# Patient Record
Sex: FEMALE | Race: BLACK OR AFRICAN AMERICAN | NOT HISPANIC OR LATINO | Employment: OTHER | URBAN - NONMETROPOLITAN AREA
[De-identification: names, ages, dates, MRNs, and addresses within clinical notes are randomized per-mention and may not be internally consistent; named-entity substitution may affect disease eponyms.]

---

## 2017-01-24 ENCOUNTER — LAB (OUTPATIENT)
Dept: LAB | Facility: HOSPITAL | Age: 82
End: 2017-01-24

## 2017-01-24 ENCOUNTER — OFFICE VISIT (OUTPATIENT)
Dept: FAMILY MEDICINE CLINIC | Facility: CLINIC | Age: 82
End: 2017-01-24

## 2017-01-24 VITALS
OXYGEN SATURATION: 100 % | WEIGHT: 118.6 LBS | BODY MASS INDEX: 23.29 KG/M2 | SYSTOLIC BLOOD PRESSURE: 120 MMHG | HEART RATE: 107 BPM | DIASTOLIC BLOOD PRESSURE: 65 MMHG | HEIGHT: 60 IN

## 2017-01-24 DIAGNOSIS — R11.11 NON-INTRACTABLE VOMITING WITHOUT NAUSEA, UNSPECIFIED VOMITING TYPE: ICD-10-CM

## 2017-01-24 DIAGNOSIS — R11.11 NON-INTRACTABLE VOMITING WITHOUT NAUSEA, UNSPECIFIED VOMITING TYPE: Primary | ICD-10-CM

## 2017-01-24 LAB
ALBUMIN SERPL-MCNC: 3.3 G/DL (ref 3.4–4.8)
ALBUMIN/GLOB SERPL: 0.9 G/DL (ref 1.1–1.8)
ALP SERPL-CCNC: 66 U/L (ref 38–126)
ALT SERPL W P-5'-P-CCNC: 22 U/L (ref 9–52)
AMYLASE SERPL-CCNC: 129 U/L (ref 50–130)
ANION GAP SERPL CALCULATED.3IONS-SCNC: 10 MMOL/L (ref 5–15)
AST SERPL-CCNC: 23 U/L (ref 14–36)
BASOPHILS # BLD AUTO: 0.03 10*3/MM3 (ref 0–0.2)
BASOPHILS NFR BLD AUTO: 0.4 % (ref 0–2)
BILIRUB SERPL-MCNC: 0.6 MG/DL (ref 0.2–1.3)
BUN BLD-MCNC: 44 MG/DL (ref 7–21)
BUN/CREAT SERPL: 29.1 (ref 7–25)
CALCIUM SPEC-SCNC: 8.9 MG/DL (ref 8.4–10.2)
CHLORIDE SERPL-SCNC: 101 MMOL/L (ref 95–110)
CO2 SERPL-SCNC: 27 MMOL/L (ref 22–31)
CREAT BLD-MCNC: 1.51 MG/DL (ref 0.5–1)
DEPRECATED RDW RBC AUTO: 43.2 FL (ref 36.4–46.3)
EOSINOPHIL # BLD AUTO: 0.36 10*3/MM3 (ref 0–0.7)
EOSINOPHIL NFR BLD AUTO: 5.3 % (ref 0–7)
ERYTHROCYTE [DISTWIDTH] IN BLOOD BY AUTOMATED COUNT: 13.1 % (ref 11.5–14.5)
GFR SERPL CREATININE-BSD FRML MDRD: 38 ML/MIN/1.73 (ref 39–90)
GLOBULIN UR ELPH-MCNC: 3.6 GM/DL (ref 2.3–3.5)
GLUCOSE BLD-MCNC: 109 MG/DL (ref 60–100)
HCT VFR BLD AUTO: 32.9 % (ref 35–45)
HGB BLD-MCNC: 10.7 G/DL (ref 12–15.5)
IMM GRANULOCYTES # BLD: 0.01 10*3/MM3 (ref 0–0.02)
IMM GRANULOCYTES NFR BLD: 0.1 % (ref 0–0.5)
LIPASE SERPL-CCNC: 188 U/L (ref 23–300)
LYMPHOCYTES # BLD AUTO: 2.19 10*3/MM3 (ref 0.6–4.2)
LYMPHOCYTES NFR BLD AUTO: 32.2 % (ref 10–50)
MCH RBC QN AUTO: 29.3 PG (ref 26.5–34)
MCHC RBC AUTO-ENTMCNC: 32.5 G/DL (ref 31.4–36)
MCV RBC AUTO: 90.1 FL (ref 80–98)
MONOCYTES # BLD AUTO: 0.59 10*3/MM3 (ref 0–0.9)
MONOCYTES NFR BLD AUTO: 8.7 % (ref 0–12)
NEUTROPHILS # BLD AUTO: 3.62 10*3/MM3 (ref 2–8.6)
NEUTROPHILS NFR BLD AUTO: 53.3 % (ref 37–80)
PLATELET # BLD AUTO: 252 10*3/MM3 (ref 150–450)
PMV BLD AUTO: 10.9 FL (ref 8–12)
POTASSIUM BLD-SCNC: 4.6 MMOL/L (ref 3.5–5.1)
PROT SERPL-MCNC: 6.9 G/DL (ref 6.3–8.6)
RBC # BLD AUTO: 3.65 10*6/MM3 (ref 3.77–5.16)
SODIUM BLD-SCNC: 138 MMOL/L (ref 137–145)
WBC NRBC COR # BLD: 6.8 10*3/MM3 (ref 3.2–9.8)

## 2017-01-24 PROCEDURE — 99213 OFFICE O/P EST LOW 20 MIN: CPT | Performed by: GENERAL PRACTICE

## 2017-01-24 PROCEDURE — 82150 ASSAY OF AMYLASE: CPT | Performed by: GENERAL PRACTICE

## 2017-01-24 PROCEDURE — 85025 COMPLETE CBC W/AUTO DIFF WBC: CPT | Performed by: GENERAL PRACTICE

## 2017-01-24 PROCEDURE — 83690 ASSAY OF LIPASE: CPT | Performed by: GENERAL PRACTICE

## 2017-01-24 PROCEDURE — 80053 COMPREHEN METABOLIC PANEL: CPT | Performed by: GENERAL PRACTICE

## 2017-01-24 PROCEDURE — 36415 COLL VENOUS BLD VENIPUNCTURE: CPT

## 2017-02-16 RX ORDER — BIMATOPROST 0.01 %
DROPS OPHTHALMIC (EYE)
Qty: 7.5 ML | Refills: 2 | Status: SHIPPED | OUTPATIENT
Start: 2017-02-16 | End: 2017-08-23 | Stop reason: SDUPTHER

## 2017-02-23 ENCOUNTER — OFFICE VISIT (OUTPATIENT)
Dept: OPHTHALMOLOGY | Facility: CLINIC | Age: 82
End: 2017-02-23

## 2017-02-23 DIAGNOSIS — H40.1131 PRIMARY OPEN ANGLE GLAUCOMA OF BOTH EYES, MILD STAGE: Primary | ICD-10-CM

## 2017-02-23 DIAGNOSIS — Z96.1 PSEUDOPHAKIA: ICD-10-CM

## 2017-02-23 PROCEDURE — 99213 OFFICE O/P EST LOW 20 MIN: CPT | Performed by: OPHTHALMOLOGY

## 2017-02-23 NOTE — PROGRESS NOTES
Subjective   Radha Frias [facility] is a 89 y.o. female.   Chief Complaint   Patient presents with   • Glaucoma     Low risk   • Artificial Lens Present       HPI     Glaucoma   In both eyes. Additional comments: Low risk       Last edited by Ryder Bazzi MD on 2/23/2017  1:31 PM. (History)          Review of Systems   Eyes: Negative for pain and visual disturbance.       Objective   Visual Acuity (Snellen - Linear)      Right Left   Dist cc 20/70 -2 20/50 -2       Correction:  Glasses               Pupils      Pupils   Right PERRL   Left PERRL           Confrontational Visual Fields     Visual Fields      Left Right   Result Full Full                  Extraocular Movement      Right Left   Result Full Full              Tonometry (Applanation, 1:31 PM)      Right Left   Pressure 15 15              Main Ophthalmology Exam     External Exam      Right Left    External Normal Normal      Slit Lamp Exam      Right Left    Lids/Lashes Normal Normal    Conjunctiva/Sclera White and quiet White and quiet    Cornea Clear Clear    Anterior Chamber Deep and quiet Deep and quiet    Iris Round and reactive Round and reactive    Lens Posterior chamber intraocular lens Posterior chamber intraocular lens    Vitreous Normal Normal      Fundus Exam      Right Left    Disc Sloped cup  Sloped cup    Macula Normal Normal    Vessels Normal Normal                Assessment/Plan   Diagnoses and all orders for this visit:    Primary open angle glaucoma of both eyes, mild stage    Pseudophakia              Return in about 6 months (around 8/23/2017) for OCT DISC, Dilated exam.

## 2017-03-20 ENCOUNTER — TELEPHONE (OUTPATIENT)
Dept: FAMILY MEDICINE CLINIC | Facility: CLINIC | Age: 82
End: 2017-03-20

## 2017-03-20 NOTE — TELEPHONE ENCOUNTER
MRS MIMMS HAS APPOINTMENT TO BE SEEN ON Friday BUT HER SON IS IN AND WOULD LIKE FOR HER TO BE SEEN TOMORROW IF POSSIBLE

## 2017-03-21 ENCOUNTER — OFFICE VISIT (OUTPATIENT)
Dept: FAMILY MEDICINE CLINIC | Facility: CLINIC | Age: 82
End: 2017-03-21

## 2017-03-21 ENCOUNTER — LAB (OUTPATIENT)
Dept: LAB | Facility: HOSPITAL | Age: 82
End: 2017-03-21

## 2017-03-21 VITALS
HEART RATE: 61 BPM | OXYGEN SATURATION: 99 % | WEIGHT: 117.8 LBS | HEIGHT: 60 IN | BODY MASS INDEX: 23.13 KG/M2 | SYSTOLIC BLOOD PRESSURE: 125 MMHG | DIASTOLIC BLOOD PRESSURE: 60 MMHG

## 2017-03-21 DIAGNOSIS — I10 ESSENTIAL HYPERTENSION: Primary | ICD-10-CM

## 2017-03-21 DIAGNOSIS — D64.9 ANEMIA, UNSPECIFIED TYPE: ICD-10-CM

## 2017-03-21 DIAGNOSIS — R53.83 OTHER FATIGUE: ICD-10-CM

## 2017-03-21 PROCEDURE — 99213 OFFICE O/P EST LOW 20 MIN: CPT | Performed by: GENERAL PRACTICE

## 2017-03-21 NOTE — PROGRESS NOTES
Subjective   Radha Frias [facility] is a 89 y.o. female.     Chief Complaint   Patient presents with   • Follow-up   • Hypertension     Hypertension   This is a chronic problem. The current episode started more than 1 year ago. The problem is unchanged. Pertinent negatives include no chest pain, headaches, neck pain, palpitations or shortness of breath. There are no associated agents to hypertension. There are no known risk factors for coronary artery disease. Past treatments include beta blockers and diuretics. The current treatment provides significant improvement. There are no compliance problems.    Has been sleeping more, more tired. Eating fair. Has some anemia probably of age.    The following portions of the patient's history were reviewed and updated as appropriate: allergies, current medications, past social history and problem list.    Current Outpatient Prescriptions:   •  levobunolol (BETAGAN) 0.5 % ophthalmic solution, Administer 1 drop to both eyes 2 (Two) Times a Day., Disp: 15 mL, Rfl: 3  •  LUMIGAN 0.01 % ophthalmic drops, INSTILL 1 DROP IN BOTH EYES AT BEDTIME, Disp: 7.5 mL, Rfl: 2  •  metoprolol succinate XL (TOPROL XL) 25 MG 24 hr tablet, Take 1 tablet by mouth Daily., Disp: 90 tablet, Rfl: 3  •  triamterene-hydrochlorothiazide (MAXZIDE-25) 37.5-25 MG per tablet, Take 0.5 tablets by mouth Daily., Disp: 45 tablet, Rfl: 3    Review of Systems   Constitutional: Positive for fatigue. Negative for activity change, appetite change, chills, fever and unexpected weight change.   HENT: Negative.  Negative for congestion, ear pain, hearing loss, nosebleeds, rhinorrhea, sinus pressure, sneezing, sore throat, tinnitus and trouble swallowing.    Eyes: Negative.  Negative for pain, discharge, redness, itching and visual disturbance.   Respiratory: Negative.  Negative for apnea, cough, chest tightness, shortness of breath and wheezing.    Cardiovascular: Negative.  Negative for chest pain, palpitations and leg  "swelling.   Gastrointestinal: Negative.  Negative for abdominal distention, abdominal pain, constipation, diarrhea, nausea and vomiting.   Endocrine: Negative.    Genitourinary: Negative.  Negative for dysuria, frequency and urgency.   Musculoskeletal: Negative.  Negative for arthralgias, back pain, gait problem, joint swelling, myalgias, neck pain and neck stiffness.   Skin: Negative.  Negative for color change and rash.   Allergic/Immunologic: Negative.    Neurological: Negative.  Negative for dizziness, weakness, light-headedness, numbness and headaches.   Hematological: Negative.  Negative for adenopathy.   Psychiatric/Behavioral: Negative.  Negative for dysphoric mood and sleep disturbance. The patient is not nervous/anxious.      Objective     Visit Vitals   • /60 (BP Location: Left arm, Patient Position: Sitting, Cuff Size: Adult)   • Pulse 61   • Ht 60\" (152.4 cm)   • Wt 117 lb 12.8 oz (53.4 kg)   • SpO2 99%   • BMI 23.01 kg/m2     Physical Exam   Constitutional: She is oriented to person, place, and time. She appears well-developed and well-nourished. No distress.   HENT:   Head: Normocephalic and atraumatic.   Nose: Nose normal.   Mouth/Throat: Oropharynx is clear and moist.   Eyes: Conjunctivae and EOM are normal. Pupils are equal, round, and reactive to light. Right eye exhibits no discharge. Left eye exhibits no discharge.   Neck: No thyromegaly present.   Cardiovascular: Normal rate, regular rhythm, normal heart sounds and intact distal pulses.    Pulmonary/Chest: Effort normal and breath sounds normal.   Lymphadenopathy:     She has no cervical adenopathy.   Neurological: She is alert and oriented to person, place, and time.   Skin: Skin is warm and dry.   Psychiatric: She has a normal mood and affect.   Nursing note and vitals reviewed.    Assessment/Plan     Problem List Items Addressed This Visit        Cardiovascular and Mediastinum    Essential hypertension - Primary      Other Visit " Diagnoses     Anemia, unspecified type        Relevant Orders    CBC & Differential    Other fatigue        Relevant Orders    CBC & Differential    Basic Metabolic Panel    Vitamin B12    TSH    T4, Free        Will notify regarding results. Start multivitamin.     No orders of the defined types were placed in this encounter.

## 2017-03-24 RX ORDER — LORATADINE 10 MG/1
10 TABLET ORAL DAILY
Qty: 30 TABLET | Refills: 5 | Status: SHIPPED | OUTPATIENT
Start: 2017-03-24 | End: 2017-06-21 | Stop reason: SDUPTHER

## 2017-03-30 ENCOUNTER — TELEPHONE (OUTPATIENT)
Dept: FAMILY MEDICINE CLINIC | Facility: CLINIC | Age: 82
End: 2017-03-30

## 2017-03-30 RX ORDER — FLUTICASONE PROPIONATE 50 MCG
2 SPRAY, SUSPENSION (ML) NASAL DAILY
Qty: 1 EACH | Refills: 1 | Status: SHIPPED | OUTPATIENT
Start: 2017-03-30 | End: 2017-04-29

## 2017-03-30 NOTE — TELEPHONE ENCOUNTER
MRS CALLE HAS SINUS DRAINAGE DOWN BACK OF HER THROAT AND HER NOSE IS STOPPED UP SHE IS TAKING HER ALLERGY MEDICATION BUT NOT HELPING DOES NOT HAVE FEVER AT THIS TIME IS THERE ANYTHING SHE NEEDS TO DO

## 2017-04-20 ENCOUNTER — APPOINTMENT (OUTPATIENT)
Dept: LAB | Facility: HOSPITAL | Age: 82
End: 2017-04-20

## 2017-04-20 LAB
ANION GAP SERPL CALCULATED.3IONS-SCNC: 12 MMOL/L (ref 5–15)
BASOPHILS # BLD AUTO: 0.04 10*3/MM3 (ref 0–0.2)
BASOPHILS NFR BLD AUTO: 0.5 % (ref 0–2)
BUN BLD-MCNC: 41 MG/DL (ref 7–21)
BUN/CREAT SERPL: 26.3 (ref 7–25)
CALCIUM SPEC-SCNC: 8.7 MG/DL (ref 8.4–10.2)
CHLORIDE SERPL-SCNC: 99 MMOL/L (ref 95–110)
CO2 SERPL-SCNC: 25 MMOL/L (ref 22–31)
CREAT BLD-MCNC: 1.56 MG/DL (ref 0.5–1)
DEPRECATED RDW RBC AUTO: 43.5 FL (ref 36.4–46.3)
EOSINOPHIL # BLD AUTO: 0.27 10*3/MM3 (ref 0–0.7)
EOSINOPHIL NFR BLD AUTO: 3.6 % (ref 0–7)
ERYTHROCYTE [DISTWIDTH] IN BLOOD BY AUTOMATED COUNT: 13.4 % (ref 11.5–14.5)
GFR SERPL CREATININE-BSD FRML MDRD: 37 ML/MIN/1.73 (ref 39–90)
GLUCOSE BLD-MCNC: 104 MG/DL (ref 60–100)
HCT VFR BLD AUTO: 32.9 % (ref 35–45)
HGB BLD-MCNC: 10.8 G/DL (ref 12–15.5)
IMM GRANULOCYTES # BLD: 0.01 10*3/MM3 (ref 0–0.02)
IMM GRANULOCYTES NFR BLD: 0.1 % (ref 0–0.5)
LYMPHOCYTES # BLD AUTO: 2.15 10*3/MM3 (ref 0.6–4.2)
LYMPHOCYTES NFR BLD AUTO: 28.7 % (ref 10–50)
MCH RBC QN AUTO: 29.6 PG (ref 26.5–34)
MCHC RBC AUTO-ENTMCNC: 32.8 G/DL (ref 31.4–36)
MCV RBC AUTO: 90.1 FL (ref 80–98)
MONOCYTES # BLD AUTO: 0.69 10*3/MM3 (ref 0–0.9)
MONOCYTES NFR BLD AUTO: 9.2 % (ref 0–12)
NEUTROPHILS # BLD AUTO: 4.33 10*3/MM3 (ref 2–8.6)
NEUTROPHILS NFR BLD AUTO: 57.9 % (ref 37–80)
PLATELET # BLD AUTO: 279 10*3/MM3 (ref 150–450)
PMV BLD AUTO: 10.8 FL (ref 8–12)
POTASSIUM BLD-SCNC: 4.7 MMOL/L (ref 3.5–5.1)
RBC # BLD AUTO: 3.65 10*6/MM3 (ref 3.77–5.16)
SODIUM BLD-SCNC: 136 MMOL/L (ref 137–145)
T4 FREE SERPL-MCNC: 1.29 NG/DL (ref 0.78–2.19)
TSH SERPL DL<=0.05 MIU/L-ACNC: 2.96 MIU/ML (ref 0.46–4.68)
VIT B12 BLD-MCNC: 518 PG/ML (ref 239–931)
WBC NRBC COR # BLD: 7.49 10*3/MM3 (ref 3.2–9.8)

## 2017-04-20 PROCEDURE — 85025 COMPLETE CBC W/AUTO DIFF WBC: CPT | Performed by: GENERAL PRACTICE

## 2017-04-20 PROCEDURE — 84439 ASSAY OF FREE THYROXINE: CPT

## 2017-04-20 PROCEDURE — 36415 COLL VENOUS BLD VENIPUNCTURE: CPT | Performed by: GENERAL PRACTICE

## 2017-04-20 PROCEDURE — 84443 ASSAY THYROID STIM HORMONE: CPT

## 2017-04-20 PROCEDURE — 82607 VITAMIN B-12: CPT

## 2017-04-20 PROCEDURE — 80048 BASIC METABOLIC PNL TOTAL CA: CPT | Performed by: GENERAL PRACTICE

## 2017-05-03 ENCOUNTER — OFFICE VISIT (OUTPATIENT)
Dept: PODIATRY | Facility: CLINIC | Age: 82
End: 2017-05-03

## 2017-05-03 VITALS — HEIGHT: 60 IN | WEIGHT: 117 LBS | BODY MASS INDEX: 22.97 KG/M2

## 2017-05-03 DIAGNOSIS — M79.672 FOOT PAIN, BILATERAL: Primary | ICD-10-CM

## 2017-05-03 DIAGNOSIS — M20.41 HAMMER TOES OF BOTH FEET: ICD-10-CM

## 2017-05-03 DIAGNOSIS — B35.1 ONYCHOMYCOSIS: ICD-10-CM

## 2017-05-03 DIAGNOSIS — M79.671 FOOT PAIN, BILATERAL: Primary | ICD-10-CM

## 2017-05-03 DIAGNOSIS — M20.42 HAMMER TOES OF BOTH FEET: ICD-10-CM

## 2017-05-03 PROCEDURE — 99213 OFFICE O/P EST LOW 20 MIN: CPT | Performed by: PODIATRIST

## 2017-06-21 ENCOUNTER — OFFICE VISIT (OUTPATIENT)
Dept: FAMILY MEDICINE CLINIC | Facility: CLINIC | Age: 82
End: 2017-06-21

## 2017-06-21 VITALS
HEIGHT: 60 IN | OXYGEN SATURATION: 99 % | HEART RATE: 61 BPM | BODY MASS INDEX: 22.93 KG/M2 | DIASTOLIC BLOOD PRESSURE: 65 MMHG | SYSTOLIC BLOOD PRESSURE: 118 MMHG | WEIGHT: 116.8 LBS

## 2017-06-21 DIAGNOSIS — I10 ESSENTIAL HYPERTENSION: Primary | Chronic | ICD-10-CM

## 2017-06-21 PROCEDURE — 99212 OFFICE O/P EST SF 10 MIN: CPT | Performed by: GENERAL PRACTICE

## 2017-06-21 RX ORDER — FLUTICASONE PROPIONATE 50 MCG
SPRAY, SUSPENSION (ML) NASAL
COMMUNITY
Start: 2017-05-24 | End: 2017-06-21 | Stop reason: SDUPTHER

## 2017-06-21 RX ORDER — LORATADINE 10 MG/1
10 TABLET ORAL DAILY
Qty: 30 TABLET | Refills: 5 | Status: SHIPPED | OUTPATIENT
Start: 2017-06-21 | End: 2018-01-23 | Stop reason: HOSPADM

## 2017-06-21 RX ORDER — TRIAMTERENE AND HYDROCHLOROTHIAZIDE 37.5; 25 MG/1; MG/1
0.5 TABLET ORAL DAILY
Qty: 45 TABLET | Refills: 3 | Status: SHIPPED | OUTPATIENT
Start: 2017-06-21

## 2017-06-21 RX ORDER — FLUTICASONE PROPIONATE 50 MCG
2 SPRAY, SUSPENSION (ML) NASAL DAILY
Qty: 1 EACH | Refills: 5 | Status: SHIPPED | OUTPATIENT
Start: 2017-06-21 | End: 2017-09-13 | Stop reason: ALTCHOICE

## 2017-06-21 RX ORDER — METOPROLOL SUCCINATE 25 MG/1
25 TABLET, EXTENDED RELEASE ORAL DAILY
Qty: 90 TABLET | Refills: 3 | Status: SHIPPED | OUTPATIENT
Start: 2017-06-21

## 2017-06-21 NOTE — PROGRESS NOTES
Subjective   Radha Frias [facility] is a 89 y.o. female.   Chief Complaint   Patient presents with   • Follow-up   • Hypertension     Hypertension   This is a chronic problem. The current episode started more than 1 year ago. The problem is unchanged. Pertinent negatives include no chest pain, headaches, neck pain, palpitations or shortness of breath. There are no associated agents to hypertension. There are no known risk factors for coronary artery disease. Past treatments include beta blockers and diuretics. The current treatment provides significant improvement. There are no compliance problems.       The following portions of the patient's history were reviewed and updated as appropriate: allergies, current medications, past social history and problem list.    Current Outpatient Prescriptions:   •  levobunolol (BETAGAN) 0.5 % ophthalmic solution, Administer 1 drop to both eyes 2 (Two) Times a Day., Disp: 15 mL, Rfl: 3  •  loratadine (ALAVERT) 10 MG tablet, Take 1 tablet by mouth Daily., Disp: 30 tablet, Rfl: 5  •  LUMIGAN 0.01 % ophthalmic drops, INSTILL 1 DROP IN BOTH EYES AT BEDTIME, Disp: 7.5 mL, Rfl: 2  •  metoprolol succinate XL (TOPROL XL) 25 MG 24 hr tablet, Take 1 tablet by mouth Daily., Disp: 90 tablet, Rfl: 3  •  Multiple Vitamins-Minerals (CENTRUM SILVER PO), Take 1 tablet by mouth Daily., Disp: , Rfl:   •  triamterene-hydrochlorothiazide (MAXZIDE-25) 37.5-25 MG per tablet, Take 0.5 tablets by mouth Daily., Disp: 45 tablet, Rfl: 3  •  fluticasone (FLONASE) 50 MCG/ACT nasal spray, 2 sprays into each nostril Daily., Disp: 1 each, Rfl: 5    Review of Systems   Constitutional: Negative.  Negative for activity change, appetite change, chills, fatigue, fever and unexpected weight change.   HENT: Negative.  Negative for congestion, ear pain, hearing loss, nosebleeds, rhinorrhea, sinus pressure, sneezing, sore throat, tinnitus and trouble swallowing.    Eyes: Negative.  Negative for pain, discharge, redness,  "itching and visual disturbance.   Respiratory: Negative.  Negative for apnea, cough, chest tightness, shortness of breath and wheezing.    Cardiovascular: Negative.  Negative for chest pain, palpitations and leg swelling.   Gastrointestinal: Negative.  Negative for abdominal distention, abdominal pain, constipation, diarrhea, nausea and vomiting.   Endocrine: Negative.    Genitourinary: Negative.  Negative for dysuria, frequency and urgency.   Musculoskeletal: Negative.  Negative for arthralgias, back pain, gait problem, joint swelling, myalgias, neck pain and neck stiffness.   Skin: Negative.  Negative for color change and rash.   Allergic/Immunologic: Negative.    Neurological: Negative.  Negative for dizziness, weakness, light-headedness, numbness and headaches.   Hematological: Negative.  Negative for adenopathy.   Psychiatric/Behavioral: Negative.  Negative for dysphoric mood and sleep disturbance. The patient is not nervous/anxious.      Objective   Visit Vitals   • /65 (BP Location: Left arm, Patient Position: Sitting, Cuff Size: Adult)   • Pulse 61   • Ht 60\" (152.4 cm)   • Wt 116 lb 12.8 oz (53 kg)   • SpO2 99%   • BMI 22.81 kg/m2     Physical Exam   Constitutional: She is oriented to person, place, and time. She appears well-developed and well-nourished. No distress.   HENT:   Head: Normocephalic and atraumatic.   Nose: Nose normal.   Mouth/Throat: Oropharynx is clear and moist.   Eyes: Conjunctivae and EOM are normal. Pupils are equal, round, and reactive to light. Right eye exhibits no discharge. Left eye exhibits no discharge.   Neck: No thyromegaly present.   Cardiovascular: Normal rate, regular rhythm, normal heart sounds and intact distal pulses.    Pulmonary/Chest: Effort normal and breath sounds normal.   Lymphadenopathy:     She has no cervical adenopathy.   Neurological: She is alert and oriented to person, place, and time.   Skin: Skin is warm and dry.   Psychiatric: She has a normal mood " and affect.   Nursing note and vitals reviewed.    Assessment/Plan   Problem List Items Addressed This Visit        Cardiovascular and Mediastinum    Essential hypertension - Primary (Chronic)    Relevant Medications    metoprolol succinate XL (TOPROL XL) 25 MG 24 hr tablet    triamterene-hydrochlorothiazide (MAXZIDE-25) 37.5-25 MG per tablet          New Medications Ordered This Visit   Medications   • Multiple Vitamins-Minerals (CENTRUM SILVER PO)     Sig: Take 1 tablet by mouth Daily.   • fluticasone (FLONASE) 50 MCG/ACT nasal spray     Si sprays into each nostril Daily.     Dispense:  1 each     Refill:  5   • loratadine (ALAVERT) 10 MG tablet     Sig: Take 1 tablet by mouth Daily.     Dispense:  30 tablet     Refill:  5   • metoprolol succinate XL (TOPROL XL) 25 MG 24 hr tablet     Sig: Take 1 tablet by mouth Daily.     Dispense:  90 tablet     Refill:  3     Dosage change   • triamterene-hydrochlorothiazide (MAXZIDE-25) 37.5-25 MG per tablet     Sig: Take 0.5 tablets by mouth Daily.     Dispense:  45 tablet     Refill:  3     Return in about 3 months (around 2017) for Recheck.

## 2017-07-03 ENCOUNTER — OFFICE VISIT (OUTPATIENT)
Dept: OTOLARYNGOLOGY | Facility: CLINIC | Age: 82
End: 2017-07-03

## 2017-07-03 VITALS — TEMPERATURE: 97 F | WEIGHT: 116 LBS | HEIGHT: 55 IN | BODY MASS INDEX: 26.85 KG/M2

## 2017-07-03 DIAGNOSIS — J30.0 VASOMOTOR RHINITIS: Primary | ICD-10-CM

## 2017-07-03 PROCEDURE — 99203 OFFICE O/P NEW LOW 30 MIN: CPT | Performed by: OTOLARYNGOLOGY

## 2017-07-03 RX ORDER — IPRATROPIUM BROMIDE 42 UG/1
2 SPRAY, METERED NASAL NIGHTLY PRN
Qty: 15 ML | Refills: 11 | Status: SHIPPED | OUTPATIENT
Start: 2017-07-03 | End: 2017-08-03 | Stop reason: SDUPTHER

## 2017-07-03 NOTE — PROGRESS NOTES
Subjective   Radha Hilario is a 89 y.o. female.   CC:intermittent bothersome PND  History of Present Illness    has a history of intermittent drainage and postnasal drip her history is not clear and she comes that her son against further details he says is been a few weeks  since she's had a problem.    She wants to relate her postnasal drip to nausea and dizziness but they're not necessarily related according to her son she has had sore throat no facial swelling or purulence fever chills no chronic sinus congestion.   She's been on Flonase  And Loratidine without success.  She does have glaucoma but it's open angle glaucoma and well-controlled.      The following portions of the patient's history were reviewed and updated as appropriate: allergies, current medications, past family history, past medical history, past social history, past surgical history and problem list.      Radha Hilario reports that she has never smoked. She does not have any smokeless tobacco history on file. She reports that she does not drink alcohol.  Patient is not a tobacco user and has not been counseled for use of tobacco products    Family History   Problem Relation Age of Onset   • Heart disease Other          Current Outpatient Prescriptions:   •  fluticasone (FLONASE) 50 MCG/ACT nasal spray, 2 sprays into each nostril Daily., Disp: 1 each, Rfl: 5  •  levobunolol (BETAGAN) 0.5 % ophthalmic solution, Administer 1 drop to both eyes 2 (Two) Times a Day., Disp: 15 mL, Rfl: 3  •  loratadine (ALAVERT) 10 MG tablet, Take 1 tablet by mouth Daily., Disp: 30 tablet, Rfl: 5  •  LUMIGAN 0.01 % ophthalmic drops, INSTILL 1 DROP IN BOTH EYES AT BEDTIME, Disp: 7.5 mL, Rfl: 2  •  Multiple Vitamins-Minerals (CENTRUM SILVER PO), Take 1 tablet by mouth Daily., Disp: , Rfl:   •  triamterene-hydrochlorothiazide (MAXZIDE-25) 37.5-25 MG per tablet, Take 0.5 tablets by mouth Daily., Disp: 45 tablet, Rfl: 3  •  ipratropium (ATROVENT) 0.06  % nasal spray, 2 sprays into each nostril At Night As Needed for Rhinitis., Disp: 15 mL, Rfl: 11  •  metoprolol succinate XL (TOPROL XL) 25 MG 24 hr tablet, Take 1 tablet by mouth Daily., Disp: 90 tablet, Rfl: 3      Past Medical History:   Diagnosis Date   • Abrasion, knee     unspecified knee, initial encounter - minor   • Acquired hammer toe     other than great toe   • Acute bronchitis    • Age related cataract    • Artificial lens present     both eyes   • Artificial lens present    • Bunion    • Corns and callus    • Edema    • Essential hypertension    • Hallux valgus    • Hammer toe    • Ingrowing nail    • Leg pain, right    • Nasal congestion     ? due to eye drops   • Need for prophylactic vaccination against Streptococcus pneumoniae (pneumococcus) and influenza    • Neuropathy    • Nonexudative age-related macular degeneration    • Primary open angle glaucoma of both eyes    • Ulcer of toe          Review of Systems   Constitutional: Negative for fever.   HENT: Positive for postnasal drip. Negative for facial swelling, nosebleeds, rhinorrhea and sinus pressure.    Neurological: Positive for headaches.   All other systems reviewed and are negative.          Objective   Physical Exam   Constitutional: She is oriented to person, place, and time. She appears well-developed and well-nourished.   HENT:   Head: Normocephalic and atraumatic.   Right Ear: Hearing, tympanic membrane, external ear and ear canal normal.   Left Ear: Hearing, tympanic membrane, external ear and ear canal normal.   Nose: Mucosal edema present. No rhinorrhea, nasal deformity or septal deviation. No epistaxis. Right sinus exhibits no maxillary sinus tenderness and no frontal sinus tenderness. Left sinus exhibits no maxillary sinus tenderness and no frontal sinus tenderness.   Mouth/Throat: Uvula is midline and oropharynx is clear and moist. No trismus in the jaw. Normal dentition. No oropharyngeal exudate or posterior oropharyngeal  edema.   Eyes: Conjunctivae are normal.   Neck: Trachea normal, normal range of motion and phonation normal. Neck supple. No JVD present. No tracheal deviation present. No thyroid mass and no thyromegaly present.   Cardiovascular: Normal rate and regular rhythm.    Pulmonary/Chest: Effort normal and breath sounds normal.   Musculoskeletal: Normal range of motion.   Lymphadenopathy:        Head (right side): No submental, no submandibular, no tonsillar, no preauricular, no posterior auricular and no occipital adenopathy present.        Head (left side): No submental, no submandibular, no tonsillar, no preauricular, no posterior auricular and no occipital adenopathy present.     She has no cervical adenopathy.        Right cervical: No superficial cervical, no deep cervical and no posterior cervical adenopathy present.       Left cervical: No superficial cervical, no deep cervical and no posterior cervical adenopathy present.   Neurological: She is alert and oriented to person, place, and time. No cranial nerve deficit.   Skin: Skin is warm.   Psychiatric: She has a normal mood and affect. Her speech is normal and behavior is normal. Thought content normal.   Nursing note and vitals reviewed.            Assessment/Plan   Radha was seen today for sinus problem.    Diagnoses and all orders for this visit:    Vasomotor rhinitis    Other orders  -     ipratropium (ATROVENT) 0.06 % nasal spray; 2 sprays into each nostril At Night As Needed for Rhinitis.    I'm suggesting we just she is Atrovent only at night.  She has only open angle glaucoma no other known contraindications to its use.  Procedure back in a month.  Imaging other studies can be considered but thinks likely some of antihistamines which she's not responding to the past section may cause more symptoms.  This was discussed in detail with her her and her son all questions were answered and agree with this approach.

## 2017-08-03 ENCOUNTER — OFFICE VISIT (OUTPATIENT)
Dept: OTOLARYNGOLOGY | Facility: CLINIC | Age: 82
End: 2017-08-03

## 2017-08-03 VITALS — WEIGHT: 116 LBS | TEMPERATURE: 97.2 F | HEIGHT: 55 IN | BODY MASS INDEX: 26.85 KG/M2

## 2017-08-03 DIAGNOSIS — J30.0 VASOMOTOR RHINITIS: Primary | ICD-10-CM

## 2017-08-03 PROCEDURE — 99213 OFFICE O/P EST LOW 20 MIN: CPT | Performed by: OTOLARYNGOLOGY

## 2017-08-03 RX ORDER — IPRATROPIUM BROMIDE 42 UG/1
2 SPRAY, METERED NASAL NIGHTLY PRN
Qty: 15 ML | Refills: 11 | Status: SHIPPED | OUTPATIENT
Start: 2017-08-03

## 2017-08-03 NOTE — PROGRESS NOTES
Subjective   Radha Frias [facility] is a 89 y.o. female.     Chief complaint: follow-up rhinitis occur specially night and early morning  History of Present Illness   This using her nasal spray but she her son feel like us make a significant difference in her drainage symptoms she's not having facial pain fever chills or sore throat      The following portions of the patient's history were reviewed and updated as appropriate: allergies, current medications, past family history, past medical history, past social history, past surgical history and problem list.      Current Outpatient Prescriptions:   •  fluticasone (FLONASE) 50 MCG/ACT nasal spray, 2 sprays into each nostril Daily., Disp: 1 each, Rfl: 5  •  ipratropium (ATROVENT) 0.06 % nasal spray, 2 sprays into each nostril At Night As Needed for Rhinitis., Disp: 15 mL, Rfl: 11  •  levobunolol (BETAGAN) 0.5 % ophthalmic solution, Administer 1 drop to both eyes 2 (Two) Times a Day., Disp: 15 mL, Rfl: 3  •  loratadine (ALAVERT) 10 MG tablet, Take 1 tablet by mouth Daily., Disp: 30 tablet, Rfl: 5  •  LUMIGAN 0.01 % ophthalmic drops, INSTILL 1 DROP IN BOTH EYES AT BEDTIME, Disp: 7.5 mL, Rfl: 2  •  metoprolol succinate XL (TOPROL XL) 25 MG 24 hr tablet, Take 1 tablet by mouth Daily., Disp: 90 tablet, Rfl: 3  •  Multiple Vitamins-Minerals (CENTRUM SILVER PO), Take 1 tablet by mouth Daily., Disp: , Rfl:   •  triamterene-hydrochlorothiazide (MAXZIDE-25) 37.5-25 MG per tablet, Take 0.5 tablets by mouth Daily., Disp: 45 tablet, Rfl: 3    Allergies   Allergen Reactions   • Lisinopril Anaphylaxis   • Alphagan P [Brimonidine Tartrate]    • Brimonidine    • Diamox Sequels [Acetazolamide]        Review of Systems   Constitutional: Positive for fatigue. Negative for fever.   HENT: Positive for postnasal drip. Negative for facial swelling, nosebleeds and sinus pressure.            Objective   Physical Exam   Constitutional: She is oriented to person, place, and time. She appears  well-developed and well-nourished.   HENT:   Head: Normocephalic and atraumatic.   Right Ear: Hearing, tympanic membrane, external ear and ear canal normal.   Left Ear: Hearing, tympanic membrane, external ear and ear canal normal.   Nose: Mucosal edema present. No rhinorrhea, nasal deformity or septal deviation. No epistaxis. Right sinus exhibits no maxillary sinus tenderness and no frontal sinus tenderness. Left sinus exhibits no maxillary sinus tenderness and no frontal sinus tenderness.   Mouth/Throat: Uvula is midline, oropharynx is clear and moist and mucous membranes are normal. No trismus in the jaw. Normal dentition. No oropharyngeal exudate or posterior oropharyngeal edema. No tonsillar exudate.       Eyes: Conjunctivae are normal.   Neck: Trachea normal, normal range of motion and phonation normal. Neck supple. No JVD present. No tracheal deviation present. No thyroid mass and no thyromegaly present.   Cardiovascular: Normal rate.    Pulmonary/Chest: Effort normal.   Musculoskeletal: Normal range of motion.   Lymphadenopathy:        Head (right side): No submental, no submandibular, no tonsillar, no preauricular, no posterior auricular and no occipital adenopathy present.        Head (left side): No submental, no submandibular, no tonsillar, no preauricular, no posterior auricular and no occipital adenopathy present.     She has no cervical adenopathy.        Right cervical: No superficial cervical, no deep cervical and no posterior cervical adenopathy present.       Left cervical: No superficial cervical, no deep cervical and no posterior cervical adenopathy present.   Neurological: She is alert and oriented to person, place, and time. No cranial nerve deficit.   Skin: Skin is warm.   Psychiatric: She has a normal mood and affect. Her speech is normal and behavior is normal. Thought content normal.   Nursing note and vitals reviewed.          Assessment/Plan   Radha was seen today for  follow-up.    Diagnoses and all orders for this visit:    Vasomotor rhinitis

## 2017-08-07 ENCOUNTER — OFFICE VISIT (OUTPATIENT)
Dept: PODIATRY | Facility: CLINIC | Age: 82
End: 2017-08-07

## 2017-08-07 VITALS — BODY MASS INDEX: 27.08 KG/M2 | WEIGHT: 117 LBS | HEIGHT: 55 IN

## 2017-08-07 DIAGNOSIS — M20.41 HAMMER TOES OF BOTH FEET: ICD-10-CM

## 2017-08-07 DIAGNOSIS — M20.42 HAMMER TOES OF BOTH FEET: ICD-10-CM

## 2017-08-07 DIAGNOSIS — G89.29 CHRONIC TOE PAIN, BILATERAL: Primary | ICD-10-CM

## 2017-08-07 DIAGNOSIS — M79.675 CHRONIC TOE PAIN, BILATERAL: Primary | ICD-10-CM

## 2017-08-07 DIAGNOSIS — B35.1 ONYCHOMYCOSIS: ICD-10-CM

## 2017-08-07 DIAGNOSIS — M79.674 CHRONIC TOE PAIN, BILATERAL: Primary | ICD-10-CM

## 2017-08-07 PROCEDURE — 11721 DEBRIDE NAIL 6 OR MORE: CPT | Performed by: PODIATRIST

## 2017-08-07 PROCEDURE — 99212 OFFICE O/P EST SF 10 MIN: CPT | Performed by: PODIATRIST

## 2017-08-07 NOTE — PROGRESS NOTES
Radha Frias [facility]  2/3/1914  89 y.o. female     Patient presents today with her son present for nail care.       8/7/2017  Chief Complaint   Patient presents with   • Left Foot - nail care   • Right Foot - nail care           History of Present Illness    Patient presents to clinic today compared by her son for routine footcare.  States that her nails have become long and painful especially walking.  She is unable to cut them herself due to the thickness.  She also continues to complain of pain to her left fourth toe.  She has not been using the gel toe spacer dispense to her on her last visit.  She states that rubs in her shoes.  This is a chronic problem for this patient has been going on for several years.  She denies any injuries to the toe.  She has no other pedal complaints.          Past Medical History:   Diagnosis Date   • Abrasion, knee     unspecified knee, initial encounter - minor   • Acquired hammer toe     other than great toe   • Acute bronchitis    • Age related cataract    • Artificial lens present     both eyes   • Artificial lens present    • Bunion    • Corns and callus    • Edema    • Essential hypertension    • Hallux valgus    • Hammer toe    • Ingrowing nail    • Leg pain, right    • Nasal congestion     ? due to eye drops   • Need for prophylactic vaccination against Streptococcus pneumoniae (pneumococcus) and influenza    • Neuropathy    • Nonexudative age-related macular degeneration    • Primary open angle glaucoma of both eyes    • Ulcer of toe          Past Surgical History:   Procedure Laterality Date   • APPENDECTOMY  02/07/1998   • CATARACT EXTRACTION WITH INTRAOCULAR LENS IMPLANT  02/13/2001   • INJECTION OF MEDICATION  03/30/2012    kenalog   • PROCEDURE GENERIC CONVERTED  04/08/2013    debride nail 6 or more   • PROCEDURE GENERIC CONVERTED  06/23/2015    paring corn/callus   • REFRACTIVE SURGERY  07/30/1987   • TOE TENDON REPAIR  03/22/2013    incision of toe tendon bottom  "of foot   • URETEROLITHOTOMY  06/28/1979         Family History   Problem Relation Age of Onset   • Heart disease Other          Social History     Social History   • Marital status:      Spouse name: N/A   • Number of children: N/A   • Years of education: N/A     Occupational History   • Not on file.     Social History Main Topics   • Smoking status: Never Smoker   • Smokeless tobacco: Never Used   • Alcohol use No   • Drug use: Not on file   • Sexual activity: Not on file     Other Topics Concern   • Not on file     Social History Narrative         Current Outpatient Prescriptions   Medication Sig Dispense Refill   • fluticasone (FLONASE) 50 MCG/ACT nasal spray 2 sprays into each nostril Daily. 1 each 5   • ipratropium (ATROVENT) 0.06 % nasal spray 2 sprays into each nostril At Night As Needed for Rhinitis. 15 mL 11   • levobunolol (BETAGAN) 0.5 % ophthalmic solution Administer 1 drop to both eyes 2 (Two) Times a Day. 15 mL 3   • loratadine (ALAVERT) 10 MG tablet Take 1 tablet by mouth Daily. 30 tablet 5   • LUMIGAN 0.01 % ophthalmic drops INSTILL 1 DROP IN BOTH EYES AT BEDTIME 7.5 mL 2   • metoprolol succinate XL (TOPROL XL) 25 MG 24 hr tablet Take 1 tablet by mouth Daily. 90 tablet 3   • Multiple Vitamins-Minerals (CENTRUM SILVER PO) Take 1 tablet by mouth Daily.     • triamterene-hydrochlorothiazide (MAXZIDE-25) 37.5-25 MG per tablet Take 0.5 tablets by mouth Daily. 45 tablet 3     No current facility-administered medications for this visit.          OBJECTIVE    Ht 55\" (139.7 cm)  Wt 117 lb (53.1 kg)  BMI 27.19 kg/m2      Review of Systems   Constitutional: Negative for chills and fever.   Cardiovascular: Negative for chest pain.   Gastrointestinal: Negative for constipation, diarrhea, nausea and vomiting.   Skin: Negative for wound.  long painful toenails  Musculoskeletal: foot pain      Constitutional: well developed, well nourished    HEENT: Normocephalic and atraumatic, normal " hearing    Respiratory: Non labored respirations noted    Cardiovascular:    DP/PT pulses palpable    CFT brisk  to all digits  Skin temp is warm to warm from proximal tibia to distal digits  Pedal hair growth present.   No erythema or edema noted     Musculoskeletal:  Muscle strength is 5/5 for all muscle groups tested   ROM of the 1st MTP is decreased without pain or crepitus  ROM of the MTJ is full without pain or crepitus    ROM of the STJ is full without pain or crepitus    ROM of the ankle joint is decreased without pain or crepitus    POP to left fourth toe  Pain on palpation to the toenails  HAV with contracted digits 2-5 bilateral  Rectus foot type     Dermatological:   Nails 1-5 are discolored, thickened and elongated with subungual debris bilateral  Skin is warm, dry and intact    Webspaces 1-4 bilateral are clean, dry and intact.   No subcutaneous nodules or masses noted    No open wounds noted     Neurological:   Protective sensation intact    Sensation intact to light touch    DTR intact    Psychiatric: A&O x 3 with normal mood and affect. NAD.         Procedures        ASSESSMENT AND PLAN    Radha was seen today for nail care and nail care.    Diagnoses and all orders for this visit:    Chronic toe pain, bilateral    Onychomycosis    Hammer toes of both feet    - Nails 1 through 5 bilateral were debrided in length and thickness without incident utilizing nail nippers.  - dispensed gel toe sleeve for left fourth toe.   - All questions were answered and the patient is in agreement with the current treatment plan.  - RTC prn          This document has been electronically signed by Serafin Ko DPM on August 7, 2017 5:59 PM     8/7/2017  5:59 PM

## 2017-08-10 ENCOUNTER — OFFICE VISIT (OUTPATIENT)
Dept: OPHTHALMOLOGY | Facility: CLINIC | Age: 82
End: 2017-08-10

## 2017-08-10 DIAGNOSIS — H01.004 BLEPHARITIS OF UPPER EYELIDS OF BOTH EYES, UNSPECIFIED TYPE: ICD-10-CM

## 2017-08-10 DIAGNOSIS — H02.833 DERMATOCHALASIS OF BOTH EYELIDS: ICD-10-CM

## 2017-08-10 DIAGNOSIS — Z96.1 PSEUDOPHAKIA: ICD-10-CM

## 2017-08-10 DIAGNOSIS — H01.001 BLEPHARITIS OF UPPER EYELIDS OF BOTH EYES, UNSPECIFIED TYPE: ICD-10-CM

## 2017-08-10 DIAGNOSIS — H02.836 DERMATOCHALASIS OF BOTH EYELIDS: ICD-10-CM

## 2017-08-10 DIAGNOSIS — H40.1131 PRIMARY OPEN ANGLE GLAUCOMA OF BOTH EYES, MILD STAGE: Primary | ICD-10-CM

## 2017-08-10 DIAGNOSIS — H26.40 SECONDARY CATARACT: ICD-10-CM

## 2017-08-10 PROCEDURE — 99214 OFFICE O/P EST MOD 30 MIN: CPT | Performed by: OPHTHALMOLOGY

## 2017-08-10 PROCEDURE — 92133 CPTRZD OPH DX IMG PST SGM ON: CPT | Performed by: OPHTHALMOLOGY

## 2017-08-10 NOTE — PROGRESS NOTES
Subjective   Radha Frias [facility] is a 89 y.o. female.   Chief Complaint   Patient presents with   • Glaucoma Suspect     HPI     Glaucoma Suspect   In both eyes.           Comments   va glaucoma: risk factors include age and race and FH in a paternal aunt. Here today for DFE and OCT of the optic nerves.   Levobunolol BID OU  Latanoprost qHS OU  Rarely misses her drops        Last edited by Christina Jolly MD on 8/10/2017  2:02 PM. (History)          Review of Systems   Constitutional: Negative for chills and fever.   Respiratory: Negative for shortness of breath.    Cardiovascular: Negative for chest pain.   Gastrointestinal: Negative for abdominal pain.   Genitourinary: Negative for dysuria.   Musculoskeletal: Negative for myalgias.   Psychiatric/Behavioral: Negative for confusion.       The following portions of the patient’s history were reviewed and updated as appropriate: current medications, allergies, past medical and surgical history and social history.       Objective   Base Eye Exam     Visual Acuity (Snellen - Linear)      Right Left   Dist cc 20/80 +1 20/50 -1   Near cc J5 J5         Tonometry (Applanation, 2:20 PM)      Right Left   Pressure 15 15         Pupils      Pupils   Right PERRL   Left PERRL         Extraocular Movement      Right Left   Result Full, Ortho Full, Ortho         Neuro/Psych     Oriented x3:  Yes    Mood/Affect:  Normal      Dilation     Both eyes:  1.0% Mydriacyl @ 1:17 PM            Slit Lamp and Fundus Exam     External Exam      Right Left    External Normal Normal      Slit Lamp Exam      Right Left    Lids/Lashes Dermatochalasis - upper lid, Blepharitis Dermatochalasis - upper lid, Blepharitis    Conjunctiva/Sclera White and quiet White and quiet    Cornea Clear Clear    Anterior Chamber Deep and quiet Deep and quiet    Iris Round and reactive Round and reactive    Lens Posterior chamber intraocular lens, Posterior capsular opacification Posterior chamber  intraocular lens, Posterior capsular opacification    Vitreous Posterior vitreous detachment Normal      Fundus Exam      Right Left    C/D Ratio 0.5 0.5    Macula RPE changes mild RPE changes    Vessels Normal Normal            Refraction     Wearing Rx      Sphere Cylinder Axis Add   Right -2.00 +1.00 093 +2.75   Left +0.75 +0.25 092 +2.75                   OCT Disc:   OD- S>T/I/N thinning ?mild progression  OS- S/I>>T thinning with mild progression       Assessment/Plan   Diagnoses and all orders for this visit:    Primary open angle glaucoma of both eyes, mild stage  Comments:  Patient has changes on OCT OS>OD but is minimal. Discussed options of changing drops vs continuing current regimen and observation -- retesting in 6 months. Discussed with patient the likelihood of her losing vision from glaucoma because of age and current changes. Patient agrees to this and will follow up in 6 months. She will sign a release of information today -- she states she would like to find an ophthalmologist closer -- options given.     Pseudophakia  Comments:  Vision is stable. Continue to observe.     Secondary cataract  Comments:  Not visually significant OU.     Dermatochalasis of both eyelids  Comments:  Continue to observe.     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     Plan:       Return in about 6 months (around 2/10/2018) for Dilated exam, OCT discs, IOP check.                            This document has been signed by Christina Jolly MD on August 10, 2017 2:20 PM

## 2017-09-13 ENCOUNTER — LAB (OUTPATIENT)
Dept: LAB | Facility: HOSPITAL | Age: 82
End: 2017-09-13

## 2017-09-13 ENCOUNTER — OFFICE VISIT (OUTPATIENT)
Dept: FAMILY MEDICINE CLINIC | Facility: CLINIC | Age: 82
End: 2017-09-13

## 2017-09-13 VITALS — DIASTOLIC BLOOD PRESSURE: 60 MMHG | SYSTOLIC BLOOD PRESSURE: 100 MMHG | HEIGHT: 55 IN | HEART RATE: 55 BPM

## 2017-09-13 DIAGNOSIS — I10 ESSENTIAL HYPERTENSION: ICD-10-CM

## 2017-09-13 DIAGNOSIS — R53.1 WEAKNESS: ICD-10-CM

## 2017-09-13 DIAGNOSIS — I10 ESSENTIAL HYPERTENSION: Primary | Chronic | ICD-10-CM

## 2017-09-13 LAB
ALBUMIN SERPL-MCNC: 3.8 G/DL (ref 3.4–4.8)
ALBUMIN/GLOB SERPL: 1.3 G/DL (ref 1.1–1.8)
ALP SERPL-CCNC: 53 U/L (ref 38–126)
ALT SERPL W P-5'-P-CCNC: 16 U/L (ref 9–52)
ANION GAP SERPL CALCULATED.3IONS-SCNC: 9 MMOL/L (ref 5–15)
AST SERPL-CCNC: 30 U/L (ref 14–36)
BASOPHILS # BLD AUTO: 0.03 10*3/MM3 (ref 0–0.2)
BASOPHILS NFR BLD AUTO: 0.5 % (ref 0–2)
BILIRUB SERPL-MCNC: 1.1 MG/DL (ref 0.2–1.3)
BUN BLD-MCNC: 30 MG/DL (ref 7–21)
BUN/CREAT SERPL: 23.1 (ref 7–25)
CALCIUM SPEC-SCNC: 9 MG/DL (ref 8.4–10.2)
CHLORIDE SERPL-SCNC: 104 MMOL/L (ref 95–110)
CO2 SERPL-SCNC: 24 MMOL/L (ref 22–31)
CREAT BLD-MCNC: 1.3 MG/DL (ref 0.5–1)
DEPRECATED RDW RBC AUTO: 41.8 FL (ref 36.4–46.3)
EOSINOPHIL # BLD AUTO: 0.29 10*3/MM3 (ref 0–0.7)
EOSINOPHIL NFR BLD AUTO: 4.6 % (ref 0–7)
ERYTHROCYTE [DISTWIDTH] IN BLOOD BY AUTOMATED COUNT: 12.8 % (ref 11.5–14.5)
GFR SERPL CREATININE-BSD FRML MDRD: 45 ML/MIN/1.73 (ref 39–90)
GLOBULIN UR ELPH-MCNC: 2.9 GM/DL (ref 2.3–3.5)
GLUCOSE BLD-MCNC: 95 MG/DL (ref 60–100)
HCT VFR BLD AUTO: 35.6 % (ref 35–45)
HGB BLD-MCNC: 11.7 G/DL (ref 12–15.5)
IMM GRANULOCYTES # BLD: 0.02 10*3/MM3 (ref 0–0.02)
IMM GRANULOCYTES NFR BLD: 0.3 % (ref 0–0.5)
LYMPHOCYTES # BLD AUTO: 1.6 10*3/MM3 (ref 0.6–4.2)
LYMPHOCYTES NFR BLD AUTO: 25.6 % (ref 10–50)
MCH RBC QN AUTO: 29.4 PG (ref 26.5–34)
MCHC RBC AUTO-ENTMCNC: 32.9 G/DL (ref 31.4–36)
MCV RBC AUTO: 89.4 FL (ref 80–98)
MONOCYTES # BLD AUTO: 0.57 10*3/MM3 (ref 0–0.9)
MONOCYTES NFR BLD AUTO: 9.1 % (ref 0–12)
NEUTROPHILS # BLD AUTO: 3.75 10*3/MM3 (ref 2–8.6)
NEUTROPHILS NFR BLD AUTO: 59.9 % (ref 37–80)
PLATELET # BLD AUTO: 277 10*3/MM3 (ref 150–450)
PMV BLD AUTO: 10.5 FL (ref 8–12)
POTASSIUM BLD-SCNC: 4.3 MMOL/L (ref 3.5–5.1)
PROT SERPL-MCNC: 6.7 G/DL (ref 6.3–8.6)
RBC # BLD AUTO: 3.98 10*6/MM3 (ref 3.77–5.16)
SODIUM BLD-SCNC: 137 MMOL/L (ref 137–145)
WBC NRBC COR # BLD: 6.26 10*3/MM3 (ref 3.2–9.8)

## 2017-09-13 PROCEDURE — 99213 OFFICE O/P EST LOW 20 MIN: CPT | Performed by: GENERAL PRACTICE

## 2017-09-13 PROCEDURE — 85025 COMPLETE CBC W/AUTO DIFF WBC: CPT | Performed by: GENERAL PRACTICE

## 2017-09-13 PROCEDURE — 36415 COLL VENOUS BLD VENIPUNCTURE: CPT

## 2017-09-13 PROCEDURE — 80053 COMPREHEN METABOLIC PANEL: CPT | Performed by: GENERAL PRACTICE

## 2017-09-13 NOTE — PROGRESS NOTES
Subjective   Radha Frias [facility] is a 89 y.o. female.   Chief Complaint   Patient presents with   • Fatigue     History of Present Illness     Didn't get out of bed yesterday. Was feeling a little weak. Didn't eat or drink much. States there was nothing wrong, just didn't feel good. Feels fine today.     The following portions of the patient's history were reviewed and updated as appropriate: allergies, current medications, past social history and problem list.    Outpatient Medications Prior to Visit   Medication Sig Dispense Refill   • bimatoprost (LUMIGAN) 0.01 % ophthalmic drops Administer 1 drop to both eyes Every Night. 7.5 mL 1   • ipratropium (ATROVENT) 0.06 % nasal spray 2 sprays into each nostril At Night As Needed for Rhinitis. 15 mL 11   • levobunolol (BETAGAN) 0.5 % ophthalmic solution Administer 1 drop to both eyes 2 (Two) Times a Day. 15 mL 3   • loratadine (ALAVERT) 10 MG tablet Take 1 tablet by mouth Daily. 30 tablet 5   • metoprolol succinate XL (TOPROL XL) 25 MG 24 hr tablet Take 1 tablet by mouth Daily. 90 tablet 3   • Multiple Vitamins-Minerals (CENTRUM SILVER PO) Take 1 tablet by mouth Daily.     • triamterene-hydrochlorothiazide (MAXZIDE-25) 37.5-25 MG per tablet Take 0.5 tablets by mouth Daily. 45 tablet 3   • fluticasone (FLONASE) 50 MCG/ACT nasal spray 2 sprays into each nostril Daily. 1 each 5     No facility-administered medications prior to visit.        Review of Systems   Constitutional: Negative.  Negative for chills, fatigue, fever and unexpected weight change.   HENT: Negative.  Negative for congestion, ear pain, hearing loss, nosebleeds, rhinorrhea, sneezing, sore throat and tinnitus.    Eyes: Negative.  Negative for discharge.   Respiratory: Negative.  Negative for cough, shortness of breath and wheezing.    Cardiovascular: Negative.  Negative for chest pain and palpitations.   Gastrointestinal: Negative.  Negative for abdominal pain, constipation, diarrhea, nausea and  "vomiting.   Endocrine: Negative.    Genitourinary: Negative.  Negative for dysuria, frequency and urgency.   Musculoskeletal: Negative.  Negative for arthralgias, back pain, joint swelling, myalgias and neck pain.   Skin: Negative.  Negative for rash.   Allergic/Immunologic: Negative.    Neurological: Negative.  Negative for dizziness, weakness, numbness and headaches.   Hematological: Negative.  Negative for adenopathy.   Psychiatric/Behavioral: Negative.  Negative for dysphoric mood and sleep disturbance. The patient is not nervous/anxious.        Objective   Visit Vitals   • /60 (BP Location: Left arm, Patient Position: Sitting, Cuff Size: Adult)   • Pulse 55   • Ht 55\" (139.7 cm)     Physical Exam   Constitutional: She is oriented to person, place, and time. She appears well-developed and well-nourished. No distress.   HENT:   Head: Normocephalic and atraumatic.   Nose: Nose normal.   Mouth/Throat: Oropharynx is clear and moist.   Eyes: Conjunctivae and EOM are normal. Pupils are equal, round, and reactive to light. Right eye exhibits no discharge. Left eye exhibits no discharge.   Neck: No thyromegaly present.   Cardiovascular: Normal rate, regular rhythm, normal heart sounds and intact distal pulses.    Pulmonary/Chest: Effort normal and breath sounds normal.   Lymphadenopathy:     She has no cervical adenopathy.   Neurological: She is alert and oriented to person, place, and time.   Skin: Skin is warm and dry.   Psychiatric: She has a normal mood and affect.   Nursing note and vitals reviewed.      Assessment/Plan   Problem List Items Addressed This Visit        Cardiovascular and Mediastinum    Essential hypertension - Primary (Chronic)    Relevant Orders    CBC & Differential (Completed)    Comprehensive Metabolic Panel (Completed)      Other Visit Diagnoses     Weakness        Relevant Orders    CBC & Differential (Completed)    Comprehensive Metabolic Panel (Completed)          Will notify regarding " results. Encouraged to try to eat and drink regularly.    No orders of the defined types were placed in this encounter.    Return in about 3 months (around 12/13/2017) for Recheck.

## 2017-11-07 ENCOUNTER — OFFICE VISIT (OUTPATIENT)
Dept: PODIATRY | Facility: CLINIC | Age: 82
End: 2017-11-07

## 2017-11-07 VITALS — WEIGHT: 116 LBS | HEIGHT: 55 IN | BODY MASS INDEX: 26.85 KG/M2 | OXYGEN SATURATION: 98 % | HEART RATE: 58 BPM

## 2017-11-07 DIAGNOSIS — M79.675 CHRONIC TOE PAIN, BILATERAL: Primary | ICD-10-CM

## 2017-11-07 DIAGNOSIS — M79.674 CHRONIC TOE PAIN, BILATERAL: Primary | ICD-10-CM

## 2017-11-07 DIAGNOSIS — B35.1 ONYCHOMYCOSIS: ICD-10-CM

## 2017-11-07 DIAGNOSIS — G89.29 CHRONIC TOE PAIN, BILATERAL: Primary | ICD-10-CM

## 2017-11-07 PROCEDURE — 11721 DEBRIDE NAIL 6 OR MORE: CPT | Performed by: PODIATRIST

## 2017-11-07 NOTE — PROGRESS NOTES
Radha Frias [facility]  2/3/1914  89 y.o. female     Patient presents today with a caregiver for routine nail care.      11/7/2017  Chief Complaint   Patient presents with   • Left Foot - nail care   • Right Foot - nail care           History of Present Illness    Patient presents to clinic today for routine footcare.  States that her nails have become long and painful especially walking.  She is unable to cut them herself due to the thickness. She has no other pedal complaints.          Past Medical History:   Diagnosis Date   • Abrasion, knee     unspecified knee, initial encounter - minor   • Acquired hammer toe     other than great toe   • Acute bronchitis    • Age related cataract    • Artificial lens present     both eyes   • Artificial lens present    • Bunion    • Corns and callus    • Edema    • Essential hypertension    • Hallux valgus    • Hammer toe    • Ingrowing nail    • Leg pain, right    • Nasal congestion     ? due to eye drops   • Need for prophylactic vaccination against Streptococcus pneumoniae (pneumococcus) and influenza    • Neuropathy    • Nonexudative age-related macular degeneration    • Onychomycosis    • Primary open angle glaucoma of both eyes    • Ulcer of toe          Past Surgical History:   Procedure Laterality Date   • APPENDECTOMY  02/07/1998   • CATARACT EXTRACTION WITH INTRAOCULAR LENS IMPLANT  02/13/2001   • INJECTION OF MEDICATION  03/30/2012    kenalog   • PROCEDURE GENERIC CONVERTED  04/08/2013    debride nail 6 or more   • PROCEDURE GENERIC CONVERTED  06/23/2015    paring corn/callus   • REFRACTIVE SURGERY  07/30/1987   • TOE TENDON REPAIR  03/22/2013    incision of toe tendon bottom of foot   • URETEROLITHOTOMY  06/28/1979         Family History   Problem Relation Age of Onset   • Heart disease Other          Social History     Social History   • Marital status:      Spouse name: N/A   • Number of children: N/A   • Years of education: N/A     Occupational History  "  • Not on file.     Social History Main Topics   • Smoking status: Never Smoker   • Smokeless tobacco: Never Used   • Alcohol use No   • Drug use: No   • Sexual activity: Defer     Other Topics Concern   • Not on file     Social History Narrative         Current Outpatient Prescriptions   Medication Sig Dispense Refill   • bimatoprost (LUMIGAN) 0.01 % ophthalmic drops Administer 1 drop to both eyes Every Night. 7.5 mL 1   • ipratropium (ATROVENT) 0.06 % nasal spray 2 sprays into each nostril At Night As Needed for Rhinitis. 15 mL 11   • levobunolol (BETAGAN) 0.5 % ophthalmic solution Administer 1 drop to both eyes 2 (Two) Times a Day. 15 mL 3   • loratadine (ALAVERT) 10 MG tablet Take 1 tablet by mouth Daily. 30 tablet 5   • metoprolol succinate XL (TOPROL XL) 25 MG 24 hr tablet Take 1 tablet by mouth Daily. 90 tablet 3   • Multiple Vitamins-Minerals (CENTRUM SILVER PO) Take 1 tablet by mouth Daily.     • triamterene-hydrochlorothiazide (MAXZIDE-25) 37.5-25 MG per tablet Take 0.5 tablets by mouth Daily. 45 tablet 3     No current facility-administered medications for this visit.          OBJECTIVE    Pulse 58  Ht 55\" (139.7 cm)  Wt 116 lb (52.6 kg)  SpO2 98%  BMI 26.96 kg/m2      Review of Systems   Constitutional: Negative for chills and fever.   Cardiovascular: Negative for chest pain.   Gastrointestinal: Negative for constipation, diarrhea, nausea and vomiting.   Skin: Negative for wound.  long painful toenails  Musculoskeletal: foot pain      Constitutional: well developed, well nourished    HEENT: Normocephalic and atraumatic, normal hearing    Respiratory: Non labored respirations noted    Cardiovascular:    DP/PT pulses palpable    CFT brisk  to all digits  Skin temp is warm to warm from proximal tibia to distal digits  Pedal hair growth present.   No erythema or edema noted     Musculoskeletal:  Muscle strength is 5/5 for all muscle groups tested   ROM of the 1st MTP is decreased without pain or " crepitus  ROM of the MTJ is full without pain or crepitus    ROM of the STJ is full without pain or crepitus    ROM of the ankle joint is decreased without pain or crepitus    POP to left fourth toe  Pain on palpation to the toenails  HAV with contracted digits 2-5 bilateral  Rectus foot type     Dermatological:   Nails 1-5 are discolored, thickened and elongated with subungual debris bilateral  Skin is warm, dry and intact    Webspaces 1-4 bilateral are clean, dry and intact.   No subcutaneous nodules or masses noted    No open wounds noted     Neurological:   Protective sensation intact    Sensation intact to light touch    DTR intact    Psychiatric: A&O x 3 with normal mood and affect. NAD.         Procedures        ASSESSMENT AND PLAN    Radha was seen today for nail care and nail care.    Diagnoses and all orders for this visit:    Chronic toe pain, bilateral    Onychomycosis    - Nails 1 through 5 bilateral were debrided in length and thickness without incident utilizing nail nippers.  - All questions were answered   - RTC 3 months          This document has been electronically signed by Serafin Ko DPM on November 7, 2017 5:26 PM     11/7/2017  5:26 PM

## 2017-11-12 ENCOUNTER — APPOINTMENT (OUTPATIENT)
Dept: GENERAL RADIOLOGY | Facility: HOSPITAL | Age: 82
End: 2017-11-12

## 2017-11-12 ENCOUNTER — HOSPITAL ENCOUNTER (EMERGENCY)
Facility: HOSPITAL | Age: 82
Discharge: HOME | End: 2017-11-12
Attending: EMERGENCY MEDICINE | Admitting: EMERGENCY MEDICINE

## 2017-11-12 VITALS
RESPIRATION RATE: 18 BRPM | SYSTOLIC BLOOD PRESSURE: 127 MMHG | BODY MASS INDEX: 25.03 KG/M2 | OXYGEN SATURATION: 100 % | HEIGHT: 57 IN | TEMPERATURE: 98.8 F | HEART RATE: 72 BPM | DIASTOLIC BLOOD PRESSURE: 59 MMHG | WEIGHT: 116 LBS

## 2017-11-12 DIAGNOSIS — J06.9 UPPER RESPIRATORY TRACT INFECTION, UNSPECIFIED TYPE: ICD-10-CM

## 2017-11-12 DIAGNOSIS — K59.00 CONSTIPATION, UNSPECIFIED CONSTIPATION TYPE: Primary | ICD-10-CM

## 2017-11-12 PROCEDURE — 74000 HC ABDOMEN KUB: CPT

## 2017-11-12 PROCEDURE — 99283 EMERGENCY DEPT VISIT LOW MDM: CPT

## 2017-11-12 RX ORDER — POLYETHYLENE GLYCOL 3350 17 G/17G
17 POWDER, FOR SOLUTION ORAL DAILY PRN
Qty: 225 G | Refills: 0 | Status: SHIPPED | OUTPATIENT
Start: 2017-11-12

## 2017-11-12 RX ORDER — AMOXICILLIN 500 MG/1
500 CAPSULE ORAL 2 TIMES DAILY
Qty: 14 CAPSULE | Refills: 0 | Status: SHIPPED | OUTPATIENT
Start: 2017-11-12 | End: 2018-01-23 | Stop reason: HOSPADM

## 2017-11-12 NOTE — ED PROVIDER NOTES
Subjective   HPI Comments: Patient presents from the [place]. Patient notes that she was not feeling good this morning.  Patient states that she had a congested feeling in her head that went through her nose and sinuses down to her throat.  Patient states this ended up and her coughing up a large amount of phlegm.  When transported there is some question of constipation though the patient denies this to this Examiner.  Patient states that she has one stool a day and had her normal stool this morning.  States that she feels comfortable now and is in no distress after coughing up the phlegm.  States this has been recurrent in the morning has a lot of sinus congestion.      History provided by:  Patient   used: No        Review of Systems   Constitutional: Negative.  Negative for appetite change, chills and fever.   HENT: Positive for sinus pressure. Negative for congestion.    Eyes: Negative.  Negative for photophobia and visual disturbance.   Respiratory: Negative.  Negative for cough, chest tightness and shortness of breath.    Cardiovascular: Negative.  Negative for chest pain and palpitations.   Gastrointestinal: Negative.  Negative for abdominal pain, constipation, diarrhea, nausea and vomiting.   Endocrine: Negative.    Genitourinary: Negative.  Negative for decreased urine volume, dysuria, flank pain and hematuria.   Musculoskeletal: Negative.  Negative for arthralgias, back pain, myalgias, neck pain and neck stiffness.   Skin: Negative.  Negative for pallor.   Neurological: Negative.  Negative for dizziness, syncope, weakness, light-headedness, numbness and headaches.   Psychiatric/Behavioral: Negative.  Negative for confusion and suicidal ideas. The patient is not nervous/anxious.    All other systems reviewed and are negative.      Past Medical History:   Diagnosis Date   • Abrasion, knee     unspecified knee, initial encounter - minor   • Acquired hammer toe     other than great toe   •  Acute bronchitis    • Age related cataract    • Artificial lens present     both eyes   • Artificial lens present    • Bunion    • Corns and callus    • Edema    • Essential hypertension    • Hallux valgus    • Hammer toe    • Ingrowing nail    • Leg pain, right    • Nasal congestion     ? due to eye drops   • Need for prophylactic vaccination against Streptococcus pneumoniae (pneumococcus) and influenza    • Neuropathy    • Nonexudative age-related macular degeneration    • Onychomycosis    • Primary open angle glaucoma of both eyes    • Ulcer of toe        Allergies   Allergen Reactions   • Lisinopril Anaphylaxis   • Alphagan P [Brimonidine Tartrate]    • Brimonidine    • Diamox Sequels [Acetazolamide]        Past Surgical History:   Procedure Laterality Date   • APPENDECTOMY  02/07/1998   • CATARACT EXTRACTION WITH INTRAOCULAR LENS IMPLANT  02/13/2001   • INJECTION OF MEDICATION  03/30/2012    kenalog   • PROCEDURE GENERIC CONVERTED  04/08/2013    debride nail 6 or more   • PROCEDURE GENERIC CONVERTED  06/23/2015    paring corn/callus   • REFRACTIVE SURGERY  07/30/1987   • TOE TENDON REPAIR  03/22/2013    incision of toe tendon bottom of foot   • URETEROLITHOTOMY  06/28/1979       Family History   Problem Relation Age of Onset   • Heart disease Other        Social History     Social History   • Marital status:      Spouse name: N/A   • Number of children: N/A   • Years of education: N/A     Social History Main Topics   • Smoking status: Never Smoker   • Smokeless tobacco: Never Used   • Alcohol use No   • Drug use: No   • Sexual activity: Defer     Other Topics Concern   • None     Social History Narrative           Objective   Physical Exam   Constitutional: She is oriented to person, place, and time. She appears well-developed and well-nourished. No distress.   Normal exam and well-appearing woman.   HENT:   Head: Normocephalic and atraumatic.   Nose: Nose normal.   Mouth/Throat: Oropharynx is clear and  moist.   Eyes: Conjunctivae and EOM are normal. No scleral icterus.   Neck: Normal range of motion. Neck supple. No JVD present.   Cardiovascular: Normal rate, regular rhythm, normal heart sounds and intact distal pulses.  Exam reveals no gallop and no friction rub.    No murmur heard.  Pulmonary/Chest: Effort normal. No respiratory distress. She has no wheezes. She has no rales. She exhibits no tenderness.   Abdominal: Soft. She exhibits no distension and no mass. There is no tenderness. There is no rebound and no guarding.   Musculoskeletal: Normal range of motion. She exhibits no edema, tenderness or deformity.   Lymphadenopathy:     She has no cervical adenopathy.   Neurological: She is alert and oriented to person, place, and time. No cranial nerve deficit. She exhibits normal muscle tone.   Skin: Skin is warm and dry. No rash noted. She is not diaphoretic. No erythema. No pallor.   Psychiatric: She has a normal mood and affect. Her behavior is normal. Judgment and thought content normal.   Nursing note and vitals reviewed.      Procedures         ED Course  ED Course      Labs Reviewed - No data to display    XR Abdomen KUB    (Results Pending)     Normal exam.  Normal vital signs stable.  KUB without evidence of obstruction or impaction.  Normal gas pattern.  Patient did have bowel movements this morning.  Currently not in pain.  Some upper respiratory congestion, recurrent.  We'll start amoxicillin for 7 days with follow-up primary care doctor.              MDM    Final diagnoses:   Constipation, unspecified constipation type   Upper respiratory tract infection, unspecified type            Mike Rodrigez MD  11/12/17 [id number]

## 2017-11-12 NOTE — ED TRIAGE NOTES
Pt reports that she is only having 1 BM a day. She was having ABD pain while having a BM. Denies any pain at this time. Pt reported to EMS that she was not being cared for. Pt is A&O at this time.

## 2018-01-17 ENCOUNTER — HOSPITAL ENCOUNTER (OUTPATIENT)
Facility: HOSPITAL | Age: 83
Setting detail: OBSERVATION
Discharge: NURSING, REHAB OR LONG-TERM CARE | End: 2018-01-23
Attending: EMERGENCY MEDICINE | Admitting: HOSPITALIST

## 2018-01-17 ENCOUNTER — APPOINTMENT (OUTPATIENT)
Dept: GENERAL RADIOLOGY | Facility: HOSPITAL | Age: 83
End: 2018-01-17

## 2018-01-17 ENCOUNTER — APPOINTMENT (OUTPATIENT)
Dept: CT IMAGING | Facility: HOSPITAL | Age: 83
End: 2018-01-17

## 2018-01-17 DIAGNOSIS — R29.6 FREQUENT FALLS: Chronic | ICD-10-CM

## 2018-01-17 DIAGNOSIS — N18.9 CHRONIC KIDNEY DISEASE, UNSPECIFIED CKD STAGE: ICD-10-CM

## 2018-01-17 DIAGNOSIS — Z74.09 IMPAIRED FUNCTIONAL MOBILITY, BALANCE, GAIT, AND ENDURANCE: ICD-10-CM

## 2018-01-17 DIAGNOSIS — S01.01XA LACERATION OF SCALP, INITIAL ENCOUNTER: Primary | ICD-10-CM

## 2018-01-17 DIAGNOSIS — E86.0 DEHYDRATION: ICD-10-CM

## 2018-01-17 DIAGNOSIS — I44.7 LEFT BUNDLE BRANCH BLOCK: ICD-10-CM

## 2018-01-17 DIAGNOSIS — M19.012 OSTEOARTHRITIS OF LEFT SHOULDER, UNSPECIFIED OSTEOARTHRITIS TYPE: ICD-10-CM

## 2018-01-17 PROBLEM — I10 HYPERTENSION: Chronic | Status: ACTIVE | Noted: 2018-01-17

## 2018-01-17 PROBLEM — R53.1 GENERALIZED WEAKNESS: Chronic | Status: ACTIVE | Noted: 2018-01-17

## 2018-01-17 LAB
ALBUMIN SERPL-MCNC: 3.9 G/DL (ref 3.4–4.8)
ALBUMIN/GLOB SERPL: 1.3 G/DL (ref 1.1–1.8)
ALP SERPL-CCNC: 81 U/L (ref 38–126)
ALT SERPL W P-5'-P-CCNC: 19 U/L (ref 9–52)
ANION GAP SERPL CALCULATED.3IONS-SCNC: 12 MMOL/L (ref 5–15)
APTT PPP: 30.6 SECONDS (ref 20–40.3)
AST SERPL-CCNC: 30 U/L (ref 14–36)
BASOPHILS # BLD AUTO: 0.03 10*3/MM3 (ref 0–0.2)
BASOPHILS NFR BLD AUTO: 0.3 % (ref 0–2)
BILIRUB SERPL-MCNC: 0.9 MG/DL (ref 0.2–1.3)
BUN BLD-MCNC: 42 MG/DL (ref 7–21)
BUN/CREAT SERPL: 26.6 (ref 7–25)
CALCIUM SPEC-SCNC: 9.2 MG/DL (ref 8.4–10.2)
CHLORIDE SERPL-SCNC: 101 MMOL/L (ref 95–110)
CO2 SERPL-SCNC: 25 MMOL/L (ref 22–31)
CREAT BLD-MCNC: 1.58 MG/DL (ref 0.5–1)
DEPRECATED RDW RBC AUTO: 42.1 FL (ref 36.4–46.3)
EOSINOPHIL # BLD AUTO: 0.1 10*3/MM3 (ref 0–0.7)
EOSINOPHIL NFR BLD AUTO: 1.1 % (ref 0–7)
ERYTHROCYTE [DISTWIDTH] IN BLOOD BY AUTOMATED COUNT: 13.1 % (ref 11.5–14.5)
GFR SERPL CREATININE-BSD FRML MDRD: 36 ML/MIN/1.73 (ref 39–90)
GLOBULIN UR ELPH-MCNC: 3.1 GM/DL (ref 2.3–3.5)
GLUCOSE BLD-MCNC: 134 MG/DL (ref 60–100)
HCT VFR BLD AUTO: 33.1 % (ref 35–45)
HGB BLD-MCNC: 11.1 G/DL (ref 12–15.5)
HOLD SPECIMEN: NORMAL
IMM GRANULOCYTES # BLD: 0.02 10*3/MM3 (ref 0–0.02)
IMM GRANULOCYTES NFR BLD: 0.2 % (ref 0–0.5)
INR PPP: 1.12 (ref 0.8–1.2)
LYMPHOCYTES # BLD AUTO: 2.17 10*3/MM3 (ref 0.6–4.2)
LYMPHOCYTES NFR BLD AUTO: 24 % (ref 10–50)
MCH RBC QN AUTO: 29.3 PG (ref 26.5–34)
MCHC RBC AUTO-ENTMCNC: 33.5 G/DL (ref 31.4–36)
MCV RBC AUTO: 87.3 FL (ref 80–98)
MONOCYTES # BLD AUTO: 0.81 10*3/MM3 (ref 0–0.9)
MONOCYTES NFR BLD AUTO: 9 % (ref 0–12)
NEUTROPHILS # BLD AUTO: 5.92 10*3/MM3 (ref 2–8.6)
NEUTROPHILS NFR BLD AUTO: 65.4 % (ref 37–80)
PLATELET # BLD AUTO: 168 10*3/MM3 (ref 150–450)
PMV BLD AUTO: 10 FL (ref 8–12)
POTASSIUM BLD-SCNC: 4.4 MMOL/L (ref 3.5–5.1)
PROT SERPL-MCNC: 7 G/DL (ref 6.3–8.6)
PROTHROMBIN TIME: 14.3 SECONDS (ref 11.1–15.3)
RBC # BLD AUTO: 3.79 10*6/MM3 (ref 3.77–5.16)
SODIUM BLD-SCNC: 138 MMOL/L (ref 137–145)
WBC NRBC COR # BLD: 9.05 10*3/MM3 (ref 3.2–9.8)

## 2018-01-17 PROCEDURE — 96366 THER/PROPH/DIAG IV INF ADDON: CPT

## 2018-01-17 PROCEDURE — 94799 UNLISTED PULMONARY SVC/PX: CPT

## 2018-01-17 PROCEDURE — 80053 COMPREHEN METABOLIC PANEL: CPT | Performed by: EMERGENCY MEDICINE

## 2018-01-17 PROCEDURE — 85610 PROTHROMBIN TIME: CPT | Performed by: EMERGENCY MEDICINE

## 2018-01-17 PROCEDURE — 94760 N-INVAS EAR/PLS OXIMETRY 1: CPT

## 2018-01-17 PROCEDURE — 93010 ELECTROCARDIOGRAM REPORT: CPT | Performed by: INTERNAL MEDICINE

## 2018-01-17 PROCEDURE — 85730 THROMBOPLASTIN TIME PARTIAL: CPT | Performed by: EMERGENCY MEDICINE

## 2018-01-17 PROCEDURE — 72170 X-RAY EXAM OF PELVIS: CPT

## 2018-01-17 PROCEDURE — G0378 HOSPITAL OBSERVATION PER HR: HCPCS

## 2018-01-17 PROCEDURE — 73030 X-RAY EXAM OF SHOULDER: CPT

## 2018-01-17 PROCEDURE — 93005 ELECTROCARDIOGRAM TRACING: CPT | Performed by: EMERGENCY MEDICINE

## 2018-01-17 PROCEDURE — 71045 X-RAY EXAM CHEST 1 VIEW: CPT

## 2018-01-17 PROCEDURE — 90714 TD VACC NO PRESV 7 YRS+ IM: CPT | Performed by: EMERGENCY MEDICINE

## 2018-01-17 PROCEDURE — 73060 X-RAY EXAM OF HUMERUS: CPT

## 2018-01-17 PROCEDURE — 96365 THER/PROPH/DIAG IV INF INIT: CPT

## 2018-01-17 PROCEDURE — 72125 CT NECK SPINE W/O DYE: CPT

## 2018-01-17 PROCEDURE — 99285 EMERGENCY DEPT VISIT HI MDM: CPT

## 2018-01-17 PROCEDURE — 70450 CT HEAD/BRAIN W/O DYE: CPT

## 2018-01-17 PROCEDURE — 25010000002 TETANUS-DIPHTHERIA TOXOIDS (ADULT) PER 0.5 ML: Performed by: EMERGENCY MEDICINE

## 2018-01-17 PROCEDURE — 85025 COMPLETE CBC W/AUTO DIFF WBC: CPT | Performed by: EMERGENCY MEDICINE

## 2018-01-17 RX ORDER — POLYETHYLENE GLYCOL 3350 17 G/17G
17 POWDER, FOR SOLUTION ORAL DAILY PRN
Status: DISCONTINUED | OUTPATIENT
Start: 2018-01-17 | End: 2018-01-23 | Stop reason: HOSPADM

## 2018-01-17 RX ORDER — LEVOBUNOLOL HYDROCHLORIDE 5 MG/ML
1 SOLUTION/ DROPS OPHTHALMIC 2 TIMES DAILY
Status: DISCONTINUED | OUTPATIENT
Start: 2018-01-17 | End: 2018-01-23 | Stop reason: HOSPADM

## 2018-01-17 RX ORDER — DIAPER,BRIEF,INFANT-TODD,DISP
EACH MISCELLANEOUS ONCE
Status: COMPLETED | OUTPATIENT
Start: 2018-01-17 | End: 2018-01-17

## 2018-01-17 RX ORDER — DEXTROSE, SODIUM CHLORIDE, AND POTASSIUM CHLORIDE 5; .45; .15 G/100ML; G/100ML; G/100ML
75 INJECTION INTRAVENOUS CONTINUOUS
Status: DISCONTINUED | OUTPATIENT
Start: 2018-01-17 | End: 2018-01-18

## 2018-01-17 RX ORDER — LIDOCAINE HYDROCHLORIDE 10 MG/ML
10 INJECTION, SOLUTION EPIDURAL; INFILTRATION; INTRACAUDAL; PERINEURAL ONCE
Status: COMPLETED | OUTPATIENT
Start: 2018-01-17 | End: 2018-01-17

## 2018-01-17 RX ORDER — DEXTROSE, SODIUM CHLORIDE, AND POTASSIUM CHLORIDE 5; .45; .15 G/100ML; G/100ML; G/100ML
100 INJECTION INTRAVENOUS CONTINUOUS
Status: DISCONTINUED | OUTPATIENT
Start: 2018-01-17 | End: 2018-01-17

## 2018-01-17 RX ORDER — SODIUM CHLORIDE 0.9 % (FLUSH) 0.9 %
1-10 SYRINGE (ML) INJECTION AS NEEDED
Status: DISCONTINUED | OUTPATIENT
Start: 2018-01-17 | End: 2018-01-23 | Stop reason: HOSPADM

## 2018-01-17 RX ORDER — LATANOPROST 50 UG/ML
1 SOLUTION/ DROPS OPHTHALMIC NIGHTLY
Status: DISCONTINUED | OUTPATIENT
Start: 2018-01-17 | End: 2018-01-23 | Stop reason: HOSPADM

## 2018-01-17 RX ORDER — METOPROLOL SUCCINATE 25 MG/1
25 TABLET, EXTENDED RELEASE ORAL DAILY
Status: DISCONTINUED | OUTPATIENT
Start: 2018-01-18 | End: 2018-01-23 | Stop reason: HOSPADM

## 2018-01-17 RX ADMIN — POTASSIUM CHLORIDE, DEXTROSE MONOHYDRATE AND SODIUM CHLORIDE 100 ML/HR: 150; 5; 450 INJECTION, SOLUTION INTRAVENOUS at 19:38

## 2018-01-17 RX ADMIN — CLOSTRIDIUM TETANI TOXOID ANTIGEN (FORMALDEHYDE INACTIVATED) AND CORYNEBACTERIUM DIPHTHERIAE TOXOID ANTIGEN (FORMALDEHYDE INACTIVATED) 0.5 ML: 5; 2 INJECTION, SUSPENSION INTRAMUSCULAR at 18:33

## 2018-01-17 RX ADMIN — BACITRACIN: 500 OINTMENT TOPICAL at 17:33

## 2018-01-17 RX ADMIN — LEVOBUNOLOL HYDROCHLORIDE 1 DROP: 5 SOLUTION/ DROPS OPHTHALMIC at 22:16

## 2018-01-17 RX ADMIN — LATANOPROST 1 DROP: 50 SOLUTION OPHTHALMIC at 22:16

## 2018-01-17 RX ADMIN — POTASSIUM CHLORIDE, DEXTROSE MONOHYDRATE AND SODIUM CHLORIDE 75 ML/HR: 150; 5; 450 INJECTION, SOLUTION INTRAVENOUS at 20:55

## 2018-01-17 RX ADMIN — LIDOCAINE HYDROCHLORIDE 10 ML: 10 INJECTION, SOLUTION EPIDURAL; INFILTRATION; INTRACAUDAL; PERINEURAL at 17:33

## 2018-01-18 ENCOUNTER — APPOINTMENT (OUTPATIENT)
Dept: CT IMAGING | Facility: HOSPITAL | Age: 83
End: 2018-01-18

## 2018-01-18 LAB
ANION GAP SERPL CALCULATED.3IONS-SCNC: 8 MMOL/L (ref 5–15)
BUN BLD-MCNC: 34 MG/DL (ref 7–21)
BUN/CREAT SERPL: 24.1 (ref 7–25)
CALCIUM SPEC-SCNC: 8.8 MG/DL (ref 8.4–10.2)
CHLORIDE SERPL-SCNC: 105 MMOL/L (ref 95–110)
CO2 SERPL-SCNC: 24 MMOL/L (ref 22–31)
CREAT BLD-MCNC: 1.41 MG/DL (ref 0.5–1)
GFR SERPL CREATININE-BSD FRML MDRD: 41 ML/MIN/1.73 (ref 39–90)
GLUCOSE BLD-MCNC: 129 MG/DL (ref 60–100)
POTASSIUM BLD-SCNC: 4.3 MMOL/L (ref 3.5–5.1)
SODIUM BLD-SCNC: 137 MMOL/L (ref 137–145)

## 2018-01-18 PROCEDURE — G0378 HOSPITAL OBSERVATION PER HR: HCPCS

## 2018-01-18 PROCEDURE — 70450 CT HEAD/BRAIN W/O DYE: CPT

## 2018-01-18 PROCEDURE — 80048 BASIC METABOLIC PNL TOTAL CA: CPT | Performed by: INTERNAL MEDICINE

## 2018-01-18 PROCEDURE — G8979 MOBILITY GOAL STATUS: HCPCS

## 2018-01-18 PROCEDURE — G8978 MOBILITY CURRENT STATUS: HCPCS

## 2018-01-18 PROCEDURE — 96366 THER/PROPH/DIAG IV INF ADDON: CPT

## 2018-01-18 PROCEDURE — 97161 PT EVAL LOW COMPLEX 20 MIN: CPT

## 2018-01-18 RX ORDER — SODIUM CHLORIDE 9 MG/ML
75 INJECTION, SOLUTION INTRAVENOUS CONTINUOUS
Status: DISCONTINUED | OUTPATIENT
Start: 2018-01-18 | End: 2018-01-20

## 2018-01-18 RX ADMIN — LEVOBUNOLOL HYDROCHLORIDE 1 DROP: 5 SOLUTION/ DROPS OPHTHALMIC at 20:01

## 2018-01-18 RX ADMIN — POTASSIUM CHLORIDE, DEXTROSE MONOHYDRATE AND SODIUM CHLORIDE 75 ML/HR: 150; 5; 450 INJECTION, SOLUTION INTRAVENOUS at 07:19

## 2018-01-18 RX ADMIN — METOPROLOL SUCCINATE 25 MG: 25 TABLET, EXTENDED RELEASE ORAL at 10:54

## 2018-01-18 RX ADMIN — SODIUM CHLORIDE 75 ML/HR: 9 INJECTION, SOLUTION INTRAVENOUS at 11:01

## 2018-01-18 RX ADMIN — LEVOBUNOLOL HYDROCHLORIDE 1 DROP: 5 SOLUTION/ DROPS OPHTHALMIC at 10:59

## 2018-01-18 RX ADMIN — LATANOPROST 1 DROP: 50 SOLUTION OPHTHALMIC at 20:01

## 2018-01-18 NOTE — THERAPY EVALUATION
Acute Care - Physical Therapy Initial Evaluation  [facility]     Patient Name: Radha Hilario  : 2/3/1914  MRN: [id number]  Today's Date: 2018   Onset of Illness/Injury or Date of Surgery Date: 18  Date of Referral to PT: 18  Referring Physician: Dr. Lane      Admit Date: 2018     Visit Dx:    ICD-10-CM ICD-9-CM   1. Laceration of scalp, initial encounter S01.01XA 873.0   2. Osteoarthritis of left shoulder, unspecified osteoarthritis type M19.012 715.91   3. Chronic kidney disease, unspecified CKD stage N18.9 585.9   4. Dehydration E86.0 276.51   5. Left bundle branch block I44.7 426.3   6. Impaired functional mobility, balance, gait, and endurance Z74.09 V49.89     Patient Active Problem List   Diagnosis   • Leg pain, right   • Pseudophakia   • Borderline glaucoma   • Primary open angle glaucoma of both eyes, mild stage   • Scalp laceration   • Hypertension   • Frequent falls   • Generalized weakness   • Dehydration     Past Medical History:   Diagnosis Date   • Atherosclerosis    • Hypertension    • Osteoarthritis    • Osteoporosis      Past Surgical History:   Procedure Laterality Date   • REFRACTIVE SURGERY     • TOE TENDON REPAIR     • URETEROLITHOTOMY            PT ASSESSMENT (last 72 hours)      PT Evaluation       18 1429 18 1000    Rehab Evaluation    Document Type evaluation  -MN     Subjective Information no complaints  -MN (r) HL (t) MN (c)     Patient Effort, Rehab Treatment poor  -MN (r) HL (t) MN (c)     General Information    Patient Profile Review yes  -MN     Onset of Illness/Injury or Date of Surgery Date 18  -MN (r) HL (t) MN (c)     Referring Physician Dr. Lane  -MN (r) HL (t) MN (c)     General Observations pt supine in bed with IV, SCD, 2L O2, and telemetry  -MN (r) HL (t) MN (c)     Pertinent History Of Current Problem pt admitted d/t acute kidney injury and s/p fall   -MN (r) HL (t) MN (c)     Precautions/Limitations fall  precautions;oxygen therapy device and L/min  -MN (r) HL (t) MN (c)     Prior Level of Function --   unable to get PLOF 2* no family present   -MN     Equipment Currently Used at Home walker, standard  -MN (r) HL (t) MN (c) walker, standard  -EW    Benefits Reviewed patient:;improve function  -MN     Barriers to Rehab previous functional deficit  -MN     Living Environment    Lives With other (see comments)   assisted living facility [facility]  -MN (r) HL (t) MN (c) other (see comments)   [facility] - assisted living  -EW    Living Arrangements assisted living  -MN (r) HL (t) MN (c) assisted living  -EW    Home Accessibility  no concerns  -EW    Type of Financial/Environmental Concern  none  -EW    Transportation Available  family or friend will provide  -EW    Clinical Impression    Date of Referral to PT 01/18/18  -MN     PT Diagnosis functional mobility deficits d/t generalized weakness  -MN (r) HL (t) MN (c)     Prognosis fair  -MN (r) HL (t) MN (c)     Criteria for Skilled Therapeutic Interventions Met yes;treatment indicated  -MN (r) HL (t) MN (c)     Pathology/Pathophysiology Noted (Describe Specifically for Each System) musculoskeletal  -MN (r) HL (t) MN (c)     Impairments Found (describe specific impairments) arousal, attention, and cognition;aerobic capacity/endurance;gait, locomotion, and balance;muscle performance;ROM  -MN (r) HL (t) MN (c)     Functional Limitations in Following Categories (Describe Specific Limitations) self-care  -MN (r) HL (t) MN (c)     Rehab Potential fair, will monitor progress closely  -MN (r) HL (t) MN (c)     Predicted Duration of Therapy Intervention (days/wks) until d/c or goals met  -MN (r) HL (t) MN (c)     Vital Signs    Pre Systolic BP Rehab 130  -MN     Pre Treatment Diastolic BP 68  -MN     Pretreatment Heart Rate (beats/min) 64  -MN     Pre SpO2 (%) 90  -MN     O2 Delivery Pre Treatment supplemental O2  -MN     Pre Patient Position Supine  -MN     Pain Assessment    Pain  Assessment 0-10  -MN     Pain Score 0  -MN     Post Pain Score 0  -MN     Vision Assessment/Intervention    Visual Impairment WFL  -MN (r) HL (t) MN (c)     Cognitive Assessment/Intervention    Current Cognitive/Communication Assessment impaired  -MN (r) HL (t) MN (c)     Orientation Status oriented to;person  -MN (r) HL (t) MN (c)     Follows Commands/Answers Questions 50% of the time;unable/difficult to assess  -MN (r) HL (t) MN (c)     Personal Safety unable/difficult to assess  -MN     ROM (Range of Motion)    General ROM Detail Unable to fully assess d/t pt cognition.  However, therapist did observe knee flexion of 20 degrees bilaterally, WFL ankle ROM bilaterally, full elbow flexion bilaterally  -MN (r) HL (t) MN (c)     Additional Documentation --   Per, RN pt got up to bathroom so full LE ROM can be assumed  -MN (r) HL (t) MN (c)     MMT (Manual Muscle Testing)    General MMT Assessment Detail unable to fully assess d/t pts cognitive status.  -MN (r) HL (t) MN (c)     Bed Mobility, Assessment/Treatment    Bed Mobility, Comment attempted to sit pt EOB. pt declined even with multiple requests from therapist  -MN (r) HL (t) MN (c)     Sensory Assessment/Intervention    Light Touch --   unable to formally assess secondary to poor cognition  -MN (r) HL (t) MN (c)     Positioning and Restraints    Pre-Treatment Position in bed  -MN (r) HL (t) MN (c)     Post Treatment Position bed  -MN (r) HL (t) MN (c)     In Bed call light within reach;encouraged to call for assist;exit alarm on  -MN (r) HL (t) MN (c)       01/17/18 2046       General Information    Equipment Currently Used at Home walker, standard  -CN     Living Environment    Lives With other (see comments)   assisted living  -CN     Living Arrangements assisted living  -CN     Home Accessibility no concerns  -CN     Stair Railings at Home none  -CN     Type of Financial/Environmental Concern none  -CN     Transportation Available ambulance  -CN       User  Key  (r) = Recorded By, (t) = Taken By, (c) = Cosigned By    Initials Name Provider Type    DIAN Dean, PT Physical Therapist    ANA Pierson, RN Registered Nurse    LILY Hernández     HL Natalie Heath, PT Student PT Student              PT Recommendation and Plan  Anticipated Equipment Needs At Discharge:  (none)  Anticipated Discharge Disposition: assisted living, home with 24/7 care  Planned Therapy Interventions: bed mobility training, gait training, home exercise program, balance training, patient/family education, strengthening, stair training, ROM (Range of Motion), stretching  PT Frequency: other (see comments) (5-14x/week)             IP PT Goals       01/18/18 1512          Bed Mobility PT LTG    Bed Mobility PT LTG, Date Established 01/18/18  -MN (r) HL (t) MN (c)      Bed Mobility PT LTG, Time to Achieve by discharge  -MN (r) HL (t) MN (c)      Bed Mobility PT LTG, Activity Type all bed mobility  -MN (r) HL (t) MN (c)      Bed Mobility PT LTG, [place] Level minimum assist (75% patient effort)  -MN (r) HL (t) MN (c)      Transfer Training PT LTG    Transfer Training PT LTG, Date Established 01/18/18  -MN (r) HL (t) MN (c)      Transfer Training PT LTG, Time to Achieve by discharge  -MN (r) HL (t) MN (c)      Transfer Training PT LTG, [place] Level minimum assist (75% patient effort)  -MN (r) HL (t) MN (c)      Transfer Training PT LTG, Assist Device walker, rolling  -MN (r) HL (t) MN (c)      Gait Training PT LTG    Gait Training Goal PT LTG, Date Established 01/18/18  -MN (r) HL (t) MN (c)      Gait Training Goal PT LTG, Time to Achieve by discharge  -MN (r) HL (t) MN (c)      Gait Training Goal PT LTG, [place] Level minimum assist (75% patient effort)  -MN (r) HL (t) MN (c)      Gait Training Goal PT LTG, Assist Device walker, rolling  -MN (r) HL (t) MN (c)      Gait Training Goal PT LTG, Distance to Achieve 50 feet  -MN (r) HL (t) MN (c)        User Key  (r) =  Recorded By, (t) = Taken By, (c) = Cosigned By    Initials Name Provider Type    DIAN Dean, PT Physical Therapist     Natalie Heath, PT Student PT Student                Outcome Measures       01/18/18 1429          How much help from another person do you currently need...    Turning from your back to your side while in flat bed without using bedrails? 3  -MN (r) HL (t) MN (c)      Moving from lying on back to sitting on the side of a flat bed without bedrails? 3  -MN (r) HL (t) MN (c)      Moving to and from a bed to a chair (including a wheelchair)? 3  -MN (r) HL (t) MN (c)      Standing up from a chair using your arms (e.g., wheelchair, bedside chair)? 3  -MN (r) HL (t) MN (c)      Climbing 3-5 steps with a railing? 2  -MN (r) HL (t) MN (c)      To walk in hospital room? 3  -MN (r) HL (t) MN (c)      AM-PAC 6 Clicks Score 17  -MN (r) HL (t)      Functional Assessment    Outcome Measure Options AM-PAC 6 Clicks Basic Mobility (PT)  -MN (r) HL (t) MN (c)        User Key  (r) = Recorded By, (t) = Taken By, (c) = Cosigned By    Initials Name Provider Type    DIAN Dean PT Physical Therapist     Natalie Heath, PT Student PT Student           Time Calculation:         PT Charges       01/18/18 1519          Time Calculation    Start Time 1429  -MN      Stop Time 1450  -MN      Time Calculation (min) 21 min  -MN      PT Received On 01/18/18  -MN      PT Goal Re-Cert Due Date 01/31/18  -MN        User Key  (r) = Recorded By, (t) = Taken By, (c) = Cosigned By    Initials Name Provider Type    DIAN Dean PT Physical Therapist          Therapy Charges for Today     Code Description Service Date Service Provider Modifiers Qty    46394606651 HC PT EVAL LOW COMPLEXITY 1 1/18/2018 Elvira Dean PT GP 1    93329606401 HC PT MOBILITY CURRENT 1/18/2018 Elvira Dean, PT GP, CK 1    34286603367 HC PT MOBILITY PROJECTED 1/18/2018 Elvira Dean, PT GP, CI 1          PT G-Codes  PT Professional  Judgement Used?: Yes  Outcome Measure Options: AM-PAC 6 Clicks Basic Mobility (PT)  Score: 17  Functional Limitation: Mobility: Walking and moving around  Mobility: Walking and Moving Around Current Status (): At least 40 percent but less than 60 percent impaired, limited or restricted  Mobility: Walking and Moving Around Goal Status (): At least 1 percent but less than 20 percent impaired, limited or restricted      Elvira Dean, PT  1/18/2018

## 2018-01-18 NOTE — PLAN OF CARE
Problem: Patient Care Overview (Adult)  Goal: Plan of Care Review  Outcome: Ongoing (interventions implemented as appropriate)   01/18/18 0351   Coping/Psychosocial Response Interventions   Plan Of Care Reviewed With patient   Patient Care Overview   Progress no change   Outcome Evaluation   Outcome Summary/Follow up Plan No new complaints at this time. Will continue to monitor.     Goal: Adult Individualization and Mutuality  Outcome: Ongoing (interventions implemented as appropriate)    Goal: Discharge Needs Assessment  Outcome: Ongoing (interventions implemented as appropriate)      Problem: Fluid Volume Deficit (Adult)  Goal: Identify Related Risk Factors and Signs and Symptoms  Outcome: Outcome(s) achieved Date Met: 01/18/18    Goal: Fluid/Electrolyte Balance  Outcome: Ongoing (interventions implemented as appropriate)    Goal: Comfort/Well Being  Outcome: Ongoing (interventions implemented as appropriate)      Problem: Fall Risk (Adult)  Goal: Identify Related Risk Factors and Signs and Symptoms  Outcome: Outcome(s) achieved Date Met: 01/18/18    Goal: Absence of Falls  Outcome: Ongoing (interventions implemented as appropriate)

## 2018-01-18 NOTE — PLAN OF CARE
Problem: Patient Care Overview (Adult)  Goal: Plan of Care Review  Outcome: Ongoing (interventions implemented as appropriate)   01/18/18 2475   Coping/Psychosocial Response Interventions   Plan Of Care Reviewed With patient   Patient Care Overview   Progress no change

## 2018-01-18 NOTE — H&P
History and Physical  Marvel Boyd MD  Hospitalist      Patient Care Team:  Katherine Mays MD as PCP - General    Chief complaint   Chief Complaint   Patient presents with   • Fall     Subjective     Patient is a 89 y.o. female admitted for repeated falls within the recent past. She has been weaker, her appetite has been poorer and earlier today she sustained a scalp laceration after another fall. She denies loss of consciousness.    History  Past Medical History:   Diagnosis Date   • Atherosclerosis    • Hypertension    • Osteoarthritis    • Osteoporosis      Past Surgical History:   Procedure Laterality Date   • REFRACTIVE SURGERY     • TOE TENDON REPAIR     • URETEROLITHOTOMY       Family History   Problem Relation Age of Onset   • Hypertension Father      Social History   Substance Use Topics   • Smoking status: Never Smoker   • Smokeless tobacco: Never Used   • Alcohol use No     Prescriptions Prior to Admission   Medication Sig Dispense Refill Last Dose   • loratadine (ALAVERT) 10 MG tablet Take 1 tablet by mouth Daily. 30 tablet 5 Taking   • metoprolol succinate XL (TOPROL XL) 25 MG 24 hr tablet Take 1 tablet by mouth Daily. 90 tablet 3 Taking   • Multiple Vitamins-Minerals (CENTRUM SILVER PO) Take 1 tablet by mouth Daily.   Taking   • polyethylene glycol (MIRALAX) packet Take 17 g by mouth Daily As Needed (constipation). 225 g 0    • triamterene-hydrochlorothiazide (MAXZIDE-25) 37.5-25 MG per tablet Take 0.5 tablets by mouth Daily. 45 tablet 3 Taking   • amoxicillin (AMOXIL) 500 MG capsule Take 1 capsule by mouth 2 (Two) Times a Day. 14 capsule 0    • bimatoprost (LUMIGAN) 0.01 % ophthalmic drops Administer 1 drop to both eyes Every Night. 7.5 mL 1 Taking   • ipratropium (ATROVENT) 0.06 % nasal spray 2 sprays into each nostril At Night As Needed for Rhinitis. 15 mL 11 Taking   • levobunolol (BETAGAN) 0.5 % ophthalmic solution Administer 1 drop to both eyes 2 (Two) Times a Day. 15 mL 3 Taking      Allergies:  Lisinopril; Alphagan p [brimonidine tartrate]; Brimonidine; and Diamox sequels [acetazolamide]    Review of Systems  Review of Systems   Constitutional: Positive for fatigue.   Respiratory: Negative for shortness of breath, wheezing and stridor.    Cardiovascular: Negative for chest pain, palpitations and leg swelling.   Gastrointestinal: Negative for abdominal distention, constipation, diarrhea and nausea.   Genitourinary: Negative for dysuria, frequency, hematuria and urgency.   Musculoskeletal: Positive for back pain and gait problem. Negative for arthralgias and joint swelling.   Neurological: Positive for weakness and headaches. Negative for syncope.   Psychiatric/Behavioral: Negative for agitation, behavioral problems and confusion.   All other systems reviewed and are negative.      Objective     Vital Signs  Temp:  [97.4 °F (36.3 °C)-97.6 °F (36.4 °C)] 97.4 °F (36.3 °C)  Heart Rate:  [73-88] 81  Resp:  [16-18] 18  BP: (121-205)/(62-88) 137/65    Physical Exam:  Physical Exam   Constitutional: She is oriented to person, place, and time. She appears cachectic. No distress.   HENT:   Head: Normocephalic and atraumatic.   Eyes: EOM are normal. Pupils are equal, round, and reactive to light. No scleral icterus.   Cardiovascular: Normal rate and regular rhythm.    Pulmonary/Chest: Effort normal and breath sounds normal.   Abdominal: Soft. Bowel sounds are normal. She exhibits no distension. There is no tenderness.   Musculoskeletal: Normal range of motion. She exhibits no edema or tenderness.   Neurological: She is alert and oriented to person, place, and time. She has normal reflexes.   Skin: Skin is warm. There is pallor.   L sided scalp laceration    Psychiatric: She has a normal mood and affect. Her behavior is normal.   Vitals reviewed.      Results Review:   Lab Results (last 24 hours)     Procedure Component Value Units Date/Time    CBC & Differential [[id number]] Collected:  01/17/18 2396     Specimen:  Blood Updated:  01/17/18 1740    Narrative:       The following orders were created for panel order CBC & Differential.  Procedure                               Abnormality         Status                     ---------                               -----------         ------                     CBC Auto Differential[[id number]]        Abnormal            Final result                 Please view results for these tests on the individual orders.    CBC Auto Differential [[id number]]  (Abnormal) Collected:  01/17/18 1733    Specimen:  Blood Updated:  01/17/18 1740     WBC 9.05 10*3/mm3      RBC 3.79 10*6/mm3      Hemoglobin 11.1 (L) g/dL      Hematocrit 33.1 (L) %      MCV 87.3 fL      MCH 29.3 pg      MCHC 33.5 g/dL      RDW 13.1 %      RDW-SD 42.1 fl      MPV 10.0 fL      Platelets 168 10*3/mm3      Neutrophil % 65.4 %      Lymphocyte % 24.0 %      Monocyte % 9.0 %      Eosinophil % 1.1 %      Basophil % 0.3 %      Immature Grans % 0.2 %      Neutrophils, Absolute 5.92 10*3/mm3      Lymphocytes, Absolute 2.17 10*3/mm3      Monocytes, Absolute 0.81 10*3/mm3      Eosinophils, Absolute 0.10 10*3/mm3      Basophils, Absolute 0.03 10*3/mm3      Immature Grans, Absolute 0.02 10*3/mm3     Comprehensive Metabolic Panel [[id number]]  (Abnormal) Collected:  01/17/18 1733    Specimen:  Blood Updated:  01/17/18 1807     Glucose 134 (H) mg/dL      BUN 42 (H) mg/dL      Creatinine 1.58 (H) mg/dL      Sodium 138 mmol/L      Potassium 4.4 mmol/L      Chloride 101 mmol/L      CO2 25.0 mmol/L      Calcium 9.2 mg/dL      Total Protein 7.0 g/dL      Albumin 3.90 g/dL      ALT (SGPT) 19 U/L      AST (SGOT) 30 U/L      Alkaline Phosphatase 81 U/L      Total Bilirubin 0.9 mg/dL      eGFR   Amer 36 (L) mL/min/1.73      Globulin 3.1 gm/dL      A/G Ratio 1.3 g/dL      BUN/Creatinine Ratio 26.6 (H)     Anion Gap 12.0 mmol/L     Narrative:       The MDRD GFR formula is only valid for adults with stable renal function between ages  18 and 70.    Protime-INR [[id number]]  (Normal) Collected:  01/17/18 1733    Specimen:  Blood Updated:  01/17/18 1818     Protime 14.3 Seconds      INR 1.12    Narrative:       Therapeutic range for most indications is 2.0-3.0 INR,  or 2.5-3.5 for mechanical heart valves.    aPTT [[id number]]  (Normal) Collected:  01/17/18 1733    Specimen:  Blood Updated:  01/17/18 1818     PTT 30.6 seconds     Narrative:       The recommended Heparin therapeutic range is 68-97 seconds.    Extra Tubes [[id number]] Collected:  01/17/18 1733    Specimen:  Blood from Blood, Venous Line Updated:  01/17/18 1846    Narrative:       The following orders were created for panel order Extra Tubes.  Procedure                               Abnormality         Status                     ---------                               -----------         ------                     Gold Top - SST[[id number]]                                   Final result                 Please view results for these tests on the individual orders.    Gold Top - SST [[id number]] Collected:  01/17/18 1733    Specimen:  Blood Updated:  01/17/18 1846     Extra Tube Hold for add-ons.      Auto resulted.             Imaging Results (last 24 hours)     Procedure Component Value Units Date/Time    XR Shoulder 2+ View Left [[id number]] Collected:  01/17/18 1736     Updated:  01/17/18 1810    Narrative:         Three view left shoulder    HISTORY: Pain after falling    AP films with the humerus in internal and external rotation and  scapular Y view were obtained.    COMPARISON: None    No fracture or dislocation.  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    Degenerative changes in the thoracic spine.  Old granulomatous disease in the chest.      Impression:       CONCLUSION:  No fracture or dislocation.  Advanced degenerative changes glenohumeral joint with probable  secondary  osteonecrosis of the humeral head.  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    [id number]    Electronically signed by:  Kevin Hernandez MD  1/17/2018 6:09 PM CST  Workstation: [id number]    XR Humerus Left [[id number]] Collected:  01/17/18 1736     Updated:  01/17/18 1812    Narrative:         Two view left humerus    HISTORY: Pain after falling    AP and lateral views obtained.    COMPARISON: None    No fracture or dislocation.  Advanced degenerative change glenohumeral joint with probable  secondary osteonecrosis of the humeral head and possible synovial  osteochondromatosis.      Impression:       CONCLUSION:  No fracture or dislocation    [id number]    Electronically signed by:  Kevin Hernandez MD  1/17/2018 6:11 PM CST  Workstation: [id number]    XR Pelvis 1 or 2 View [[id number]] Collected:  01/17/18 1736     Updated:  01/17/18 1820    Narrative:       PROCEDURE: XR PELVIS 1 OR 2 VW    VIEWS: 1    INDICATION: Pain    COMPARISON: None    FINDINGS:     - fracture: None    - alignment: Within normal limits    - misc: Age-related degenerative changes. Diffusely decreased  osseous mineralization. Degenerative changes are present in lower  lumbar spine. Irregular calcifications in the pelvis probably  represents a fibroid.          Impression:       No acute osseous abnormality identified..      Note:  if pain or symptoms persist beyond reasonable expectations    and follow-up imaging is anticipated,  cross sectional imaging   (CT and/or MRI) is suggested, as is deemed clinically   appropriate.    Electronically signed by:  Katherine Ashford MD  1/17/2018 6:19 PM CST  Workstation: [id number]    XR Chest 1 View [[id number]] Collected:  01/17/18 1736     Updated:  01/17/18 1822    Narrative:       PROCEDURE: XR CHEST 1 VW    VIEWS:Single    INDICATION: Dyspnea    COMPARISON: Acute abdominal series: 1/24/2017    FINDINGS:       - lines/tubes: None    - cardiac: Borderline size    - mediastinum: Atherosclerotic calcification and  aortic  tortuosity.     - lungs: No evidence of a focal air space process, pulmonary  interstitial edema, nodule(s)/mass.     - pleura: No evidence of  fluid.      - osseous: Degenerative changes of the bilateral shoulders are  stable.      Impression:       No acute cardiopulmonary process identified.      Electronically signed by:  Katherine Ashford MD  1/17/2018 6:21 PM CST  Workstation: [id number]    CT Head Without Contrast [[id number]] Collected:  01/17/18 1801     Updated:  01/17/18 1841    Narrative:       EXAM DESCRIPTION: CT HEAD WO CONTRAST    CLINICAL HISTORY:  head injury       COMPARISON: No studies available for comparison. Report of exam  5/22/2007 is reviewed.    DOSE LENGTH PRODUCT: 971.10    CONTRAST: None    TECHNIQUE:    Axial images from skull base to vertex.    This exam was performed according to our departmental dose  optimization program, which includes automated exposure control,  adjustment of the mA and/or KV according to patient size and/or  use of iterative reconstruction technique.    FINDINGS:  No Chiari malformation.  Extra-axial spaces: Appropriate for patient age and degree of  volume loss.    Intracranial hemorrhage: No evidence of intracranial hemorrhage  or suspicious extra-axial fluid collections.  Ventricular system: No hydrocephalus.   Basal cisterns: Patent.   Cerebral parenchyma: No edema, midline shift, or mass effect.  Gray-white differentiation is maintained. There is mild-moderate  volume loss with associated change. There is sequela chronic  microvascular disease.. She reported symmetric benign  calcification is again seen within the basal ganglia and the  cerebellar hemispheres.    Midline shift: None.    Cerebellum: No acute or suspicious finding.   Brainstem : Unremarkable CT appearance.   Calvarium: No evidence of calvarial fracture.    Vascular system: Vascular calcification, otherwise unremarkable  noncontrast appearance.    Paranasal sinuses and mastoid air cells: No  air-fluid levels or  significant mucosal thickening within the included sinuses. The  mastoids and middle ear cavities are clear.    Visualized orbits: No acute findings.    Visualized upper cervical spine: Not included.   Sella: Unremarkable.    Skull base: Unremarkable.     ADDITIONAL FINDINGS: There is a left posterior parietal scalp  contusion/hematoma.      Impression:       Left parietal scalp injury without underlying calvarial fracture,  intracranial hemorrhage, mass effect, or acute large vessel  distribution infarct.    Mild-moderate volume loss and sequela chronic microvascular  disease. Benign cerebral and cerebellar mineralization. The  latter finding was reported on the prior exam from 2017.    Electronically signed by:  Ryder Marcano MD  1/17/2018 6:40 PM  CST Workstation: [id number]    CT Cervical Spine Without Contrast [[id number]] Collected:  01/17/18 1801     Updated:  01/17/18 1849    Narrative:       EXAM DESCRIPTION: CT CERVICAL SPINE WO CONTRAST    CLINICAL HISTORY: head injury    COMPARISON: None      TECHNIQUE: Multiple contiguous noncontrast axial images are  obtained of the cervical spine. Coronal and sagittal multiplanar  reformatted images are also reviewed.   This exam was performed according to our departmental dose  optimization program, which includes automated exposure control,  adjustment of the mA and/or KV according to patient size and/or  use of iterative reconstruction technique.    DLP: 971.10     FINDINGS:   No evidence of prevertebral thickening or suspicious fluid  collection. The included lung apices are unremarkable for acute  finding. The included skull base, mastoids and paranasal sinuses  are unremarkable. There is incidental note made of large torus  palatini.    There is generalized demineralization.    There is approximate 2 mm degenerative anterolisthesis of C3 upon  C4 and retrolisthesis of C5 upon C6. Otherwise there is  straightening and reversal of normal  curvature with anatomic  alignment.  No evidence of compression fracture deformity. The craniocervical  articulations are intact. There is arthritic changes at C1-C2  however no widening of the atlantodental interval. The lateral  masses are appropriately positioned. No fracture of the dens  identified. Normal alignment of the posterior facets. No fracture  or perched facet. Arthritic changes are present greater on the  left.    Multilevel disc space narrowing, greatest at C4-5 and C5-6.    C4-5: There is mild disc osteophyte complex with left greater  than right uncovertebral posterior facet hypertrophy. There is  severe left and moderate right foraminal stenosis.    C5-6: There is disc osteophyte complex contributing to mild  central spinal canal stenosis. Uncovertebral and posterior facet  hypertrophy contribute to bilateral severe foraminal stenosis.    C6-7: Uncovertebral and posterior facet hypertrophy on the left  contributing to moderate foraminal stenosis.      Impression:       No acute fracture or subluxation of the cervical spine.    Demineralization and multilevel degenerative changes of the  cervical spine as detailed above.    Electronically signed by:  Ryder Marcano MD  1/17/2018 6:47 PM  [facility] Workstation: [id number]          Assessment/Plan     Principal Problem:    Generalized weakness  Active Problems:    Frequent falls    Scalp laceration    Hypertension    Dehydration    Gentle IV hydration - PT/OT if necessary.    Marvel Boyd MD  01/17/18  10:41 PM

## 2018-01-18 NOTE — PLAN OF CARE
Problem: Patient Care Overview (Adult)  Goal: Plan of Care Review  Outcome: Ongoing (interventions implemented as appropriate)   01/18/18 1512   Coping/Psychosocial Response Interventions   Plan Of Care Reviewed With patient   Outcome Evaluation   Outcome Summary/Follow up Plan pt is an 89 y/o female admitted with acute kidney injury and is s/p fall. It was difficult to fully assess pts functional status due to cognitive status of patient at this time. Will follow pt for skilled PT to facilitate functional mobility prior to d/c back to [place] with 24/7 assist.       Problem: Inpatient Physical Therapy  Goal: Bed Mobility Goal LTG- PT  Outcome: Ongoing (interventions implemented as appropriate)   01/18/18 1512   Bed Mobility PT LTG   Bed Mobility PT LTG, Date Established 01/18/18   Bed Mobility PT LTG, Time to Achieve by discharge   Bed Mobility PT LTG, Activity Type all bed mobility   Bed Mobility PT LTG, [place] Level minimum assist (75% patient effort)     Goal: Transfer Training Goal 1 LTG- PT  Outcome: Ongoing (interventions implemented as appropriate)   01/18/18 1512   Transfer Training PT LTG   Transfer Training PT LTG, Date Established 01/18/18   Transfer Training PT LTG, Time to Achieve by discharge   Transfer Training PT LTG, [place] Level minimum assist (75% patient effort)   Transfer Training PT LTG, Assist Device walker, rolling     Goal: Gait Training Goal LTG- PT  Outcome: Ongoing (interventions implemented as appropriate)   01/18/18 1512   Gait Training PT LTG   Gait Training Goal PT LTG, Date Established 01/18/18   Gait Training Goal PT LTG, Time to Achieve by discharge   Gait Training Goal PT LTG, [place] Level minimum assist (75% patient effort)   Gait Training Goal PT LTG, Assist Device walker, rolling   Gait Training Goal PT LTG, Distance to Achieve 50 feet

## 2018-01-18 NOTE — PROGRESS NOTES
[facility] Medicine Services  INPATIENT PROGRESS NOTE     LOS: 0 days   Patient Care Team:  Katherine Mays MD as PCP - General    Chief Complaint:  Generalized weakness      Subjective     Interval History:    Patient is seen for follow-up today.  She was admitted yesterday following a fall and sustained scalp laceration that has since been sutured.  She continues to receive IV hydration secondary to acute kidney injury.  Patient is feeling better, tolerating prescribed diet and voices no new complaints.   Patient Complaints: As above.    History taken from: Patient and her caregiver    Review of Systems:    Review of Systems   Constitutional: Positive for fatigue. Negative for activity change, appetite change, chills, diaphoresis and fever.   HENT: Negative for trouble swallowing and voice change.    Eyes: Negative for photophobia and visual disturbance.   Respiratory: Negative for cough, choking, chest tightness, shortness of breath, wheezing and stridor.    Cardiovascular: Negative for chest pain, palpitations and leg swelling.   Gastrointestinal: Negative for abdominal distention, abdominal pain, blood in stool, constipation, diarrhea, nausea and vomiting.   Endocrine: Negative for cold intolerance, heat intolerance, polydipsia, polyphagia and polyuria.   Genitourinary: Negative for decreased urine volume, difficulty urinating, dysuria, enuresis, flank pain, frequency, hematuria and urgency.   Musculoskeletal: Negative for arthralgias, gait problem, myalgias, neck pain and neck stiffness.   Skin: Negative for pallor, rash and wound.   Neurological: Positive for weakness. Negative for dizziness, tremors, seizures, syncope, facial asymmetry, speech difficulty, light-headedness, numbness and headaches.   Hematological: Does not bruise/bleed easily.   Psychiatric/Behavioral: Negative for agitation, behavioral problems and confusion.         Objective     Vital  Signs  Temp:  [97.4 °F (36.3 °C)-98.5 °F (36.9 °C)] 98.4 °F (36.9 °C)  Heart Rate:  [70-88] 70  Resp:  [16-18] 18  BP: (121-205)/(62-88) 144/65    Physical Exam:   Physical Exam   Constitutional: She is oriented to person, place, and time. She appears well-developed and well-nourished. She is cooperative. No distress.   HENT:   Head: Normocephalic and atraumatic.   Right Ear: External ear normal.   Left Ear: External ear normal.   Nose: Nose normal.   Mouth/Throat: Oropharynx is clear and moist.   Eyes: Conjunctivae and EOM are normal. Pupils are equal, round, and reactive to light.   Neck: Normal range of motion. Neck supple. No JVD present. No thyromegaly present.   Cardiovascular: Normal rate, regular rhythm, normal heart sounds and intact distal pulses.  Exam reveals no gallop and no friction rub.    No murmur heard.  Pulmonary/Chest: Effort normal and breath sounds normal. No stridor. No respiratory distress. She has no wheezes. She has no rales. She exhibits no tenderness.   Abdominal: Soft. Bowel sounds are normal. She exhibits no distension and no mass. There is no tenderness. There is no rebound and no guarding. No hernia.   Musculoskeletal: Normal range of motion. She exhibits no edema, tenderness or deformity.   Neurological: She is alert and oriented to person, place, and time. She has normal reflexes. She exhibits normal muscle tone.   Skin: Skin is warm and dry. No rash noted. She is not diaphoretic. No erythema. No pallor.   Psychiatric: She has a normal mood and affect. Her behavior is normal. Judgment and thought content normal.   Nursing note and vitals reviewed.         Results Review:       Results from last 7 days  Lab Units 01/18/18  0846 01/17/18  1733   SODIUM mmol/L 137 138   POTASSIUM mmol/L 4.3 4.4   CHLORIDE mmol/L 105 101   CO2 mmol/L 24.0 25.0   BUN mg/dL 34* 42*   CREATININE mg/dL 1.41* 1.58*   GLUCOSE mg/dL 129* 134*   CALCIUM mg/dL 8.8 9.2   BILIRUBIN mg/dL  --  0.9   ALK PHOS U/L   --  81   ALT (SGPT) U/L  --  19   AST (SGOT) U/L  --  30               Results from last 7 days  Lab Units 01/17/18  1733   WBC 10*3/mm3 9.05   HEMOGLOBIN g/dL 11.1*   HEMATOCRIT % 33.1*   PLATELETS 10*3/mm3 168       No results found for: CKTOTAL, CKMB, CKMBINDEX, TROPONINI, TROPONINT    CO2   Date Value Ref Range Status   01/18/2018 24.0 22.0 - 31.0 mmol/L Final         Results from last 7 days  Lab Units 01/17/18  1733   INR  1.12        Imaging Results (last 7 days)     Procedure Component Value Units Date/Time    XR Shoulder 2+ View Left [[id number]] Collected:  01/17/18 1736     Updated:  01/17/18 1810    Narrative:         Three view left shoulder    HISTORY: Pain after falling    AP films with the humerus in internal and external rotation and  scapular Y view were obtained.    COMPARISON: None    No fracture or dislocation.  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    Degenerative changes in the thoracic spine.  Old granulomatous disease in the chest.      Impression:       CONCLUSION:  No fracture or dislocation.  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    [id number]    Electronically signed by:  Kevin Hernandez MD  1/17/2018 6:09 PM CST  Workstation: [email or web address]    XR Humerus Left [[id number]] Collected:  01/17/18 1736     Updated:  01/17/18 1812    Narrative:         Two view left humerus    HISTORY: Pain after falling    AP and lateral views obtained.    COMPARISON: None    No fracture or dislocation.  Advanced degenerative change glenohumeral joint with probable  secondary osteonecrosis of the humeral head and possible synovial  osteochondromatosis.      Impression:       CONCLUSION:  No fracture or dislocation    [id number]    Electronically signed by:  Kevin Hernandez MD  1/17/2018 6:11 PM  CST  Workstation: [email or web address]    XR Pelvis 1 or 2 View [[id number]] Collected:  01/17/18 1736     Updated:  01/17/18 1820    Narrative:       PROCEDURE: XR PELVIS 1 OR 2 VW    VIEWS: 1    INDICATION: Pain    COMPARISON: None    FINDINGS:     - fracture: None    - alignment: Within normal limits    - misc: Age-related degenerative changes. Diffusely decreased  osseous mineralization. Degenerative changes are present in lower  lumbar spine. Irregular calcifications in the pelvis probably  represents a fibroid.          Impression:       No acute osseous abnormality identified..      Note:  if pain or symptoms persist beyond reasonable expectations    and follow-up imaging is anticipated,  cross sectional imaging   (CT and/or MRI) is suggested, as is deemed clinically   appropriate.    Electronically signed by:  Katherine Ashford MD  1/17/2018 6:19 PM CST  Workstation: [id number]    XR Chest 1 View [[id number]] Collected:  01/17/18 1736     Updated:  01/17/18 1822    Narrative:       PROCEDURE: XR CHEST 1 VW    VIEWS:Single    INDICATION: Dyspnea    COMPARISON: Acute abdominal series: 1/24/2017    FINDINGS:       - lines/tubes: None    - cardiac: Borderline size    - mediastinum: Atherosclerotic calcification and aortic  tortuosity.     - lungs: No evidence of a focal air space process, pulmonary  interstitial edema, nodule(s)/mass.     - pleura: No evidence of  fluid.      - osseous: Degenerative changes of the bilateral shoulders are  stable.      Impression:       No acute cardiopulmonary process identified.      Electronically signed by:  Katherine Ashford MD  1/17/2018 6:21 PM CST  Workstation: [id number]    CT Head Without Contrast [[id number]] Collected:  01/17/18 1801     Updated:  01/17/18 1841    Narrative:       EXAM DESCRIPTION: CT HEAD WO CONTRAST    CLINICAL HISTORY:  head injury       COMPARISON: No studies available for comparison. Report of exam  5/22/2007 is reviewed.    DOSE LENGTH PRODUCT: 971.10    CONTRAST:  None    TECHNIQUE:    Axial images from skull base to vertex.    This exam was performed according to our departmental dose  optimization program, which includes automated exposure control,  adjustment of the mA and/or KV according to patient size and/or  use of iterative reconstruction technique.    FINDINGS:  No Chiari malformation.  Extra-axial spaces: Appropriate for patient age and degree of  volume loss.    Intracranial hemorrhage: No evidence of intracranial hemorrhage  or suspicious extra-axial fluid collections.  Ventricular system: No hydrocephalus.   Basal cisterns: Patent.   Cerebral parenchyma: No edema, midline shift, or mass effect.  Gray-white differentiation is maintained. There is mild-moderate  volume loss with associated change. There is sequela chronic  microvascular disease.. She reported symmetric benign  calcification is again seen within the basal ganglia and the  cerebellar hemispheres.    Midline shift: None.    Cerebellum: No acute or suspicious finding.   Brainstem : Unremarkable CT appearance.   Calvarium: No evidence of calvarial fracture.    Vascular system: Vascular calcification, otherwise unremarkable  noncontrast appearance.    Paranasal sinuses and mastoid air cells: No air-fluid levels or  significant mucosal thickening within the included sinuses. The  mastoids and middle ear cavities are clear.    Visualized orbits: No acute findings.    Visualized upper cervical spine: Not included.   Sella: Unremarkable.    Skull base: Unremarkable.     ADDITIONAL FINDINGS: There is a left posterior parietal scalp  contusion/hematoma.      Impression:       Left parietal scalp injury without underlying calvarial fracture,  intracranial hemorrhage, mass effect, or acute large vessel  distribution infarct.    Mild-moderate volume loss and sequela chronic microvascular  disease. Benign cerebral and cerebellar mineralization. The  latter finding was reported on the prior exam from  2017.    Electronically signed by:  Ryder Marcano MD  1/17/2018 6:40 PM  CST Workstation: [id number]    CT Cervical Spine Without Contrast [[id number]] Collected:  01/17/18 1801     Updated:  01/17/18 1849    Narrative:       EXAM DESCRIPTION: CT CERVICAL SPINE WO CONTRAST    CLINICAL HISTORY: head injury    COMPARISON: None      TECHNIQUE: Multiple contiguous noncontrast axial images are  obtained of the cervical spine. Coronal and sagittal multiplanar  reformatted images are also reviewed.   This exam was performed according to our departmental dose  optimization program, which includes automated exposure control,  adjustment of the mA and/or KV according to patient size and/or  use of iterative reconstruction technique.    DLP: 971.10     FINDINGS:   No evidence of prevertebral thickening or suspicious fluid  collection. The included lung apices are unremarkable for acute  finding. The included skull base, mastoids and paranasal sinuses  are unremarkable. There is incidental note made of large torus  palatini.    There is generalized demineralization.    There is approximate 2 mm degenerative anterolisthesis of C3 upon  C4 and retrolisthesis of C5 upon C6. Otherwise there is  straightening and reversal of normal curvature with anatomic  alignment.  No evidence of compression fracture deformity. The craniocervical  articulations are intact. There is arthritic changes at C1-C2  however no widening of the atlantodental interval. The lateral  masses are appropriately positioned. No fracture of the dens  identified. Normal alignment of the posterior facets. No fracture  or perched facet. Arthritic changes are present greater on the  left.    Multilevel disc space narrowing, greatest at C4-5 and C5-6.    C4-5: There is mild disc osteophyte complex with left greater  than right uncovertebral posterior facet hypertrophy. There is  severe left and moderate right foraminal stenosis.    C5-6: There is disc osteophyte complex  contributing to mild  central spinal canal stenosis. Uncovertebral and posterior facet  hypertrophy contribute to bilateral severe foraminal stenosis.    C6-7: Uncovertebral and posterior facet hypertrophy on the left  contributing to moderate foraminal stenosis.      Impression:       No acute fracture or subluxation of the cervical spine.    Demineralization and multilevel degenerative changes of the  cervical spine as detailed above.    Electronically signed by:  Ryder Marcano MD  1/17/2018 6:47 PM  CST Workstation: [id number]                                    Medication Review:   Current Facility-Administered Medications   Medication Dose Route Frequency Provider Last Rate Last Dose   • latanoprost (XALATAN) 0.005 % ophthalmic solution 1 drop  1 drop Both Eyes Nightly Marvel Boyd MD   1 drop at 01/17/18 2216   • levobunolol (BETAGAN) 0.5 % ophthalmic solution 1 drop  1 drop Both Eyes BID Marvel Boyd MD   1 drop at 01/18/18 1059   • metoprolol succinate XL (TOPROL-XL) 24 hr tablet 25 mg  25 mg Oral Daily Marvel Boyd MD   25 mg at 01/18/18 1054   • polyethylene glycol (MIRALAX) powder 17 g  17 g Oral Daily PRN Marvel Boyd MD       • sodium chloride 0.9 % flush 1-10 mL  1-10 mL Intravenous PRN Marvel Boyd MD       • sodium chloride 0.9 % infusion  75 mL/hr Intravenous Continuous Lyle Lane MD 75 mL/hr at 01/18/18 1101 75 mL/hr at 01/18/18 1101         Assessment/Plan     Principal Problem:    Generalized weakness  Active Problems:    Scalp laceration    Hypertension    Frequent falls    Dehydration  1.  Acute kidney injury: Improving as creatinine is down from 1.58 to 1.41 today.  Continue IV hydration and monitoring of renal function.   2.  Scalp laceration: Sutured.  3.  Deconditioning: Consult PT and OT.  4.  Continue GI and DVT prophylaxis.  5.  Likely discharge in 1-2 days.          Lyle Lane MD  01/18/18      EMR Dragon/Transcription disclaimer:   Much of this encounter note is  an electronic transcription/translation of spoken language to printed text. The electronic translation of spoken language may permit erroneous, or at times, nonsensical words or phrases to be inadvertently transcribed; Although I have reviewed the note for such errors, some may still exist.

## 2018-01-19 LAB
ANION GAP SERPL CALCULATED.3IONS-SCNC: 8 MMOL/L (ref 5–15)
BUN BLD-MCNC: 31 MG/DL (ref 7–21)
BUN/CREAT SERPL: 23.3 (ref 7–25)
CALCIUM SPEC-SCNC: 8.6 MG/DL (ref 8.4–10.2)
CHLORIDE SERPL-SCNC: 106 MMOL/L (ref 95–110)
CO2 SERPL-SCNC: 23 MMOL/L (ref 22–31)
CREAT BLD-MCNC: 1.33 MG/DL (ref 0.5–1)
GFR SERPL CREATININE-BSD FRML MDRD: 44 ML/MIN/1.73 (ref 39–90)
GLUCOSE BLD-MCNC: 118 MG/DL (ref 60–100)
POTASSIUM BLD-SCNC: 4.6 MMOL/L (ref 3.5–5.1)
SODIUM BLD-SCNC: 137 MMOL/L (ref 137–145)
WHOLE BLOOD HOLD SPECIMEN: NORMAL

## 2018-01-19 PROCEDURE — 87086 URINE CULTURE/COLONY COUNT: CPT | Performed by: EMERGENCY MEDICINE

## 2018-01-19 PROCEDURE — 80048 BASIC METABOLIC PNL TOTAL CA: CPT | Performed by: INTERNAL MEDICINE

## 2018-01-19 PROCEDURE — 81001 URINALYSIS AUTO W/SCOPE: CPT | Performed by: EMERGENCY MEDICINE

## 2018-01-19 PROCEDURE — G0378 HOSPITAL OBSERVATION PER HR: HCPCS

## 2018-01-19 PROCEDURE — 96366 THER/PROPH/DIAG IV INF ADDON: CPT

## 2018-01-19 RX ADMIN — LATANOPROST 1 DROP: 50 SOLUTION OPHTHALMIC at 20:22

## 2018-01-19 RX ADMIN — LEVOBUNOLOL HYDROCHLORIDE 1 DROP: 5 SOLUTION/ DROPS OPHTHALMIC at 20:22

## 2018-01-19 RX ADMIN — SODIUM CHLORIDE 75 ML/HR: 9 INJECTION, SOLUTION INTRAVENOUS at 05:30

## 2018-01-19 RX ADMIN — LEVOBUNOLOL HYDROCHLORIDE 1 DROP: 5 SOLUTION/ DROPS OPHTHALMIC at 10:16

## 2018-01-19 NOTE — SIGNIFICANT NOTE
01/19/18 1121   Rehab Treatment   Discipline physical therapy assistant   Treatment Not Performed other (see comments)  (pt did not sleep last night and had a fall due to being restless,  Pt is sleeping now and nursing request that PT not disturb pt .  Check with RN before treating pt in AM)

## 2018-01-19 NOTE — PLAN OF CARE
Problem: Patient Care Overview (Adult)  Goal: Plan of Care Review  Outcome: Ongoing (interventions implemented as appropriate)   01/19/18 1437   Patient Care Overview   Progress no change   Outcome Evaluation   Outcome Summary/Follow up Plan RD added Ensure 3x/day and magic cups 2x/day to pt trays to optimize PO and protein intake. Will monitor.

## 2018-01-19 NOTE — SIGNIFICANT NOTE
01/19/18 1402   Rehab Treatment   Discipline physical therapy assistant   Treatment Not Performed other (see comments)  (pt still resting will not disturb at this time)

## 2018-01-19 NOTE — PLAN OF CARE
Problem: Patient Care Overview (Adult)  Goal: Plan of Care Review  Outcome: Ongoing (interventions implemented as appropriate)   01/19/18 0307   Coping/Psychosocial Response Interventions   Plan Of Care Reviewed With patient   Patient Care Overview   Progress no change     Goal: Adult Individualization and Mutuality  Outcome: Ongoing (interventions implemented as appropriate)    Goal: Discharge Needs Assessment  Outcome: Ongoing (interventions implemented as appropriate)      Problem: Fluid Volume Deficit (Adult)  Goal: Fluid/Electrolyte Balance  Outcome: Ongoing (interventions implemented as appropriate)    Goal: Comfort/Well Being  Outcome: Ongoing (interventions implemented as appropriate)      Problem: Fall Risk (Adult)  Goal: Absence of Falls  Outcome: Ongoing (interventions implemented as appropriate)      Problem: Pressure Ulcer Risk (Anup Scale) (Adult,Obstetrics,Pediatric)  Goal: Identify Related Risk Factors and Signs and Symptoms  Outcome: Outcome(s) achieved Date Met: 01/19/18    Goal: Skin Integrity  Outcome: Ongoing (interventions implemented as appropriate)

## 2018-01-19 NOTE — CONSULTS
"Adult Nutrition  Assessment    Patient Name:  Radha Frias [facility]  YOB: 1914  MRN: [id number]  Admit Date:  1/17/2018    Assessment Date:  1/19/2018    Comments:  Pt admitted with generalized weakness. Nurse asked for RD to see pt. Pt was asleep. Pt eating ~ 58% over last 3 documented meals. Notes state that pt did not sleep well last night. Pt sleeping a lot today and too tired to eat per nursing. RD added Ensure 3x/day and magic cups 2x/day to pt trays to optimize PO and protein intake. RD will monitor.  .         Reason for Assessment       01/19/18 1428    Reason for Assessment    Reason For Assessment/Visit (P)  nurse/nurse practitioner consult                Nutrition/Diet History       01/19/18 1429    Nutrition/Diet History    Patient Reported Diet/Restrictions/Preferences (P)  regular    Typical Food/Fluid Intake (P)  Pt eating ~ 58% over last 3 documented meals.              Labs/Tests/Procedures/Meds       01/19/18 1430    Labs/Tests/Procedures/Meds    Labs/Tests Review (P)  Reviewed;BUN;Creat    Medication Review (P)  Reviewed, pertinent            Physical Findings       01/19/18 1430    Physical Findings/Assessment    Additional Documentation (P)  Physical Appearance (Group)    Physical Appearance    Overall Physical Appearance (P)  generalized wasting            Estimated/Assessed Needs       01/19/18 1430    Calculation Measurements    Weight Used For Calculations (P)  51.7 kg (114 lb)    Height Used for Calculations (P)  1.448 m (4' 9\")    Estimated/Assessed Energy Needs    Energy Need Method (P)  Cochran-St Jeor    Age (P)  89    RMR (Cochran-St. Jeor Equation) (P)  745.98    Estimated Kcal Range  (P)  ~ 1,200    Estimated/Assessed Protein Needs    Weight Used for Protein Calculation (P)  51.7 kg (114 lb)    Protein (gm/kg) (P)  1.3    1.3 Gm Protein (gm) (P)  67.22    Estimated/Assessed Fluid Needs    Fluid Need Method (P)  RDA method    RDA Method (mL)  (P)  1200          "   Nutrition Prescription Ordered       01/19/18 1431    Nutrition Prescription PO    Current PO Diet (P)  Regular    Fluid Consistency (P)  Thin    Supplement (P)  Ensure Enlive;Magic Cup    Supplement Frequency (P)  3 times a day;2 times a day   B, L, & S; L & S            Evaluation of Received Nutrient/Fluid Intake       01/19/18 1432    PO Evaluation    Number of Days PO Intake Evaluated (P)  2 days    Number of Meals (P)  3    % PO Intake (P)  2x- 50%, 1x- 75%            Electronically signed by:  Justine Puri  01/19/18 2:32 PM

## 2018-01-19 NOTE — NURSING NOTE
Patient's bed alarm started alarming at 2100. Other staff and I immediately rushed to patient's room to find her sitting on her bottom in the floor. Patient was checked for any injuries with none noted at this time. Placed patient back in bed and checked VS which were stable at this time. MD made aware of pt falling and CT head ordered. At time of fall pt had bed alarm on, yellow nonskid socks, yellow bracelet on, three side rails up with bedside table within patient's reach.

## 2018-01-19 NOTE — PROGRESS NOTES
[facility] Medicine Services  INPATIENT PROGRESS NOTE     LOS: 0 days   Patient Care Team:  Katherine Mays MD as PCP - General    Chief Complaint:  Generalized weakness      Subjective     Interval History:    Patient is seen for follow-up today.  She reportedly had a fall last night but did not sustain any injuries.  She is currently sleepy and no new changes or complaints of been reported.     Patient Complaints: As above.    History taken from: Caregiver    Review of Systems:    Review of Systems   Constitutional: Positive for fatigue. Negative for activity change, appetite change, chills, diaphoresis and fever.   HENT: Negative for trouble swallowing and voice change.    Eyes: Negative for photophobia and visual disturbance.   Respiratory: Negative for cough, choking, chest tightness, shortness of breath, wheezing and stridor.    Cardiovascular: Negative for chest pain, palpitations and leg swelling.   Gastrointestinal: Negative for abdominal distention, abdominal pain, blood in stool, constipation, diarrhea, nausea and vomiting.   Endocrine: Negative for cold intolerance, heat intolerance, polydipsia, polyphagia and polyuria.   Genitourinary: Negative for decreased urine volume, difficulty urinating, dysuria, enuresis, flank pain, frequency, hematuria and urgency.   Musculoskeletal: Negative for arthralgias, gait problem, myalgias, neck pain and neck stiffness.   Skin: Negative for pallor, rash and wound.   Neurological: Positive for weakness. Negative for dizziness, tremors, seizures, syncope, facial asymmetry, speech difficulty, light-headedness, numbness and headaches.   Hematological: Does not bruise/bleed easily.   Psychiatric/Behavioral: Negative for agitation, behavioral problems and confusion.         Objective     Vital Signs  Temp:  [97.3 °F (36.3 °C)-98.2 °F (36.8 °C)] 97.9 °F (36.6 °C)  Heart Rate:  [64-82] 65  Resp:  [18] 18  BP: (123-145)/(53-88)  145/62    Physical Exam:   Physical Exam   Constitutional: She is oriented to person, place, and time. She appears well-developed and well-nourished. She is sleeping. She is easily aroused. No distress.   HENT:   Head: Normocephalic and atraumatic.   Right Ear: External ear normal.   Left Ear: External ear normal.   Nose: Nose normal.   Mouth/Throat: Oropharynx is clear and moist.   Eyes: Conjunctivae and EOM are normal. Pupils are equal, round, and reactive to light.   Neck: Normal range of motion. Neck supple. No JVD present. No thyromegaly present.   Cardiovascular: Normal rate, regular rhythm, normal heart sounds and intact distal pulses.  Exam reveals no gallop and no friction rub.    No murmur heard.  Pulmonary/Chest: Effort normal and breath sounds normal. No stridor. No respiratory distress. She has no wheezes. She has no rales. She exhibits no tenderness.   Abdominal: Soft. Bowel sounds are normal. She exhibits no distension and no mass. There is no tenderness. There is no rebound and no guarding. No hernia.   Musculoskeletal: Normal range of motion. She exhibits no edema, tenderness or deformity.   Neurological: She is oriented to person, place, and time and easily aroused. She has normal reflexes. She exhibits normal muscle tone.   Skin: Skin is warm and dry. No rash noted. She is not diaphoretic. No erythema. No pallor.   Psychiatric: She has a normal mood and affect. Her behavior is normal. Judgment and thought content normal.   Nursing note and vitals reviewed.         Results Review:         Results from last 7 days  Lab Units 01/19/18  0618 01/18/18  0846 01/17/18  1733   SODIUM mmol/L 137 137 138   POTASSIUM mmol/L 4.6 4.3 4.4   CHLORIDE mmol/L 106 105 101   CO2 mmol/L 23.0 24.0 25.0   BUN mg/dL 31* 34* 42*   CREATININE mg/dL 1.33* 1.41* 1.58*   GLUCOSE mg/dL 118* 129* 134*   CALCIUM mg/dL 8.6 8.8 9.2   BILIRUBIN mg/dL  --   --  0.9   ALK PHOS U/L  --   --  81   ALT (SGPT) U/L  --   --  19   AST  (SGOT) U/L  --   --  30               Results from last 7 days  Lab Units 01/17/18  1733   WBC 10*3/mm3 9.05   HEMOGLOBIN g/dL 11.1*   HEMATOCRIT % 33.1*   PLATELETS 10*3/mm3 168       No results found for: CKTOTAL, CKMB, CKMBINDEX, TROPONINI, TROPONINT    CO2   Date Value Ref Range Status   01/19/2018 23.0 22.0 - 31.0 mmol/L Final         Results from last 7 days  Lab Units 01/17/18  1733   INR  1.12        Imaging Results (last 7 days)     Procedure Component Value Units Date/Time    XR Shoulder 2+ View Left [[id number]] Collected:  01/17/18 1736     Updated:  01/17/18 1810    Narrative:         Three view left shoulder    HISTORY: Pain after falling    AP films with the humerus in internal and external rotation and  scapular Y view were obtained.    COMPARISON: None    No fracture or dislocation.  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    Degenerative changes in the thoracic spine.  Old granulomatous disease in the chest.      Impression:       CONCLUSION:  No fracture or dislocation.  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    [id number]    Electronically signed by:  Kevin Hernandez MD  1/17/2018 6:09 PM CST  Workstation: [id number]    XR Humerus Left [[id number]] Collected:  01/17/18 1736     Updated:  01/17/18 1812    Narrative:         Two view left humerus    HISTORY: Pain after falling    AP and lateral views obtained.    COMPARISON: None    No fracture or dislocation.  Advanced degenerative change glenohumeral joint with probable  secondary osteonecrosis of the humeral head and possible synovial  osteochondromatosis.      Impression:       CONCLUSION:  No fracture or dislocation    [id number]    Electronically signed by:  Kevin Hernandez MD  1/17/2018 6:11 PM CST  Workstation: [id number]    XR  Pelvis 1 or 2 View [[id number]] Collected:  01/17/18 1736     Updated:  01/17/18 1820    Narrative:       PROCEDURE: XR PELVIS 1 OR 2 VW    VIEWS: 1    INDICATION: Pain    COMPARISON: None    FINDINGS:     - fracture: None    - alignment: Within normal limits    - misc: Age-related degenerative changes. Diffusely decreased  osseous mineralization. Degenerative changes are present in lower  lumbar spine. Irregular calcifications in the pelvis probably  represents a fibroid.          Impression:       No acute osseous abnormality identified..      Note:  if pain or symptoms persist beyond reasonable expectations    and follow-up imaging is anticipated,  cross sectional imaging   (CT and/or MRI) is suggested, as is deemed clinically   appropriate.    Electronically signed by:  Katherine Ashford MD  1/17/2018 6:19 PM CST  Workstation: [id number]    XR Chest 1 View [[id number]] Collected:  01/17/18 1736     Updated:  01/17/18 1822    Narrative:       PROCEDURE: XR CHEST 1 VW    VIEWS:Single    INDICATION: Dyspnea    COMPARISON: Acute abdominal series: 1/24/2017    FINDINGS:       - lines/tubes: None    - cardiac: Borderline size    - mediastinum: Atherosclerotic calcification and aortic  tortuosity.     - lungs: No evidence of a focal air space process, pulmonary  interstitial edema, nodule(s)/mass.     - pleura: No evidence of  fluid.      - osseous: Degenerative changes of the bilateral shoulders are  stable.      Impression:       No acute cardiopulmonary process identified.      Electronically signed by:  Katherine Ashford MD  1/17/2018 6:21 PM CST  Workstation: [id number]    CT Head Without Contrast [[id number]] Collected:  01/17/18 1801     Updated:  01/17/18 1841    Narrative:       EXAM DESCRIPTION: CT HEAD WO CONTRAST    CLINICAL HISTORY:  head injury       COMPARISON: No studies available for comparison. Report of exam  5/22/2007 is reviewed.    DOSE LENGTH PRODUCT: 971.10    CONTRAST: None    TECHNIQUE:    Axial images from  skull base to vertex.    This exam was performed according to our departmental dose  optimization program, which includes automated exposure control,  adjustment of the mA and/or KV according to patient size and/or  use of iterative reconstruction technique.    FINDINGS:  No Chiari malformation.  Extra-axial spaces: Appropriate for patient age and degree of  volume loss.    Intracranial hemorrhage: No evidence of intracranial hemorrhage  or suspicious extra-axial fluid collections.  Ventricular system: No hydrocephalus.   Basal cisterns: Patent.   Cerebral parenchyma: No edema, midline shift, or mass effect.  Gray-white differentiation is maintained. There is mild-moderate  volume loss with associated change. There is sequela chronic  microvascular disease.. She reported symmetric benign  calcification is again seen within the basal ganglia and the  cerebellar hemispheres.    Midline shift: None.    Cerebellum: No acute or suspicious finding.   Brainstem : Unremarkable CT appearance.   Calvarium: No evidence of calvarial fracture.    Vascular system: Vascular calcification, otherwise unremarkable  noncontrast appearance.    Paranasal sinuses and mastoid air cells: No air-fluid levels or  significant mucosal thickening within the included sinuses. The  mastoids and middle ear cavities are clear.    Visualized orbits: No acute findings.    Visualized upper cervical spine: Not included.   Sella: Unremarkable.    Skull base: Unremarkable.     ADDITIONAL FINDINGS: There is a left posterior parietal scalp  contusion/hematoma.      Impression:       Left parietal scalp injury without underlying calvarial fracture,  intracranial hemorrhage, mass effect, or acute large vessel  distribution infarct.    Mild-moderate volume loss and sequela chronic microvascular  disease. Benign cerebral and cerebellar mineralization. The  latter finding was reported on the prior exam from 2017.    Electronically signed by:  Ryder Marcano MD   1/17/2018 6:40 PM  [facility] Workstation: [id number]    CT Cervical Spine Without Contrast [[id number]] Collected:  01/17/18 1801     Updated:  01/17/18 1849    Narrative:       EXAM DESCRIPTION: CT CERVICAL SPINE WO CONTRAST    CLINICAL HISTORY: head injury    COMPARISON: None      TECHNIQUE: Multiple contiguous noncontrast axial images are  obtained of the cervical spine. Coronal and sagittal multiplanar  reformatted images are also reviewed.   This exam was performed according to our departmental dose  optimization program, which includes automated exposure control,  adjustment of the mA and/or KV according to patient size and/or  use of iterative reconstruction technique.    DLP: 971.10     FINDINGS:   No evidence of prevertebral thickening or suspicious fluid  collection. The included lung apices are unremarkable for acute  finding. The included skull base, mastoids and paranasal sinuses  are unremarkable. There is incidental note made of large torus  palatini.    There is generalized demineralization.    There is approximate 2 mm degenerative anterolisthesis of C3 upon  C4 and retrolisthesis of C5 upon C6. Otherwise there is  straightening and reversal of normal curvature with anatomic  alignment.  No evidence of compression fracture deformity. The craniocervical  articulations are intact. There is arthritic changes at C1-C2  however no widening of the atlantodental interval. The lateral  masses are appropriately positioned. No fracture of the dens  identified. Normal alignment of the posterior facets. No fracture  or perched facet. Arthritic changes are present greater on the  left.    Multilevel disc space narrowing, greatest at C4-5 and C5-6.    C4-5: There is mild disc osteophyte complex with left greater  than right uncovertebral posterior facet hypertrophy. There is  severe left and moderate right foraminal stenosis.    C5-6: There is disc osteophyte complex contributing to mild  central spinal canal stenosis.  Uncovertebral and posterior facet  hypertrophy contribute to bilateral severe foraminal stenosis.    C6-7: Uncovertebral and posterior facet hypertrophy on the left  contributing to moderate foraminal stenosis.      Impression:       No acute fracture or subluxation of the cervical spine.    Demineralization and multilevel degenerative changes of the  cervical spine as detailed above.    Electronically signed by:  Ryder Marcano MD  1/17/2018 6:47 PM  CST Workstation: [id number]                                    Medication Review:   Current Facility-Administered Medications   Medication Dose Route Frequency Provider Last Rate Last Dose   • latanoprost (XALATAN) 0.005 % ophthalmic solution 1 drop  1 drop Both Eyes Nightly Marvel Boyd MD   1 drop at 01/18/18 2001   • levobunolol (BETAGAN) 0.5 % ophthalmic solution 1 drop  1 drop Both Eyes BID Marvel Boyd MD   1 drop at 01/19/18 1016   • metoprolol succinate XL (TOPROL-XL) 24 hr tablet 25 mg  25 mg Oral Daily Marvel Boyd MD   25 mg at 01/18/18 1054   • polyethylene glycol (MIRALAX) powder 17 g  17 g Oral Daily PRN Marvel Boyd MD       • sodium chloride 0.9 % flush 1-10 mL  1-10 mL Intravenous PRN Marvel Boyd MD       • sodium chloride 0.9 % infusion  75 mL/hr Intravenous Continuous Lyle Lane MD 75 mL/hr at 01/19/18 1020 75 mL/hr at 01/19/18 1020         Assessment/Plan     Principal Problem:    Generalized weakness  Active Problems:    Scalp laceration    Hypertension    Frequent falls    Dehydration  1.  Acute kidney injury: Much improved as creatinine is down from 1.33 today.  Continue IV hydration and monitoring of renal function.   2.  Scalp laceration: Sutured.  3.  Deconditioning: Consult PT and OT.  4.  Continue GI and DVT prophylaxis.  5.  Likely discharge in 1-2 days.  May need to speak to family regarding disposition and possible discharge to nursing home rather than patient going back to an assisted living facility.          Lyle HERNANDES  MD Ariana  01/19/18      EMR Dragon/Transcription disclaimer:   Much of this encounter note is an electronic transcription/translation of spoken language to printed text. The electronic translation of spoken language may permit erroneous, or at times, nonsensical words or phrases to be inadvertently transcribed; Although I have reviewed the note for such errors, some may still exist.

## 2018-01-20 LAB
ANION GAP SERPL CALCULATED.3IONS-SCNC: 6 MMOL/L (ref 5–15)
BACTERIA UR QL AUTO: ABNORMAL /HPF
BILIRUB UR QL STRIP: NEGATIVE
BUN BLD-MCNC: 24 MG/DL (ref 7–21)
BUN/CREAT SERPL: 22 (ref 7–25)
CALCIUM SPEC-SCNC: 7.8 MG/DL (ref 8.4–10.2)
CHLORIDE SERPL-SCNC: 113 MMOL/L (ref 95–110)
CLARITY UR: CLEAR
CO2 SERPL-SCNC: 18 MMOL/L (ref 22–31)
COLOR UR: YELLOW
CREAT BLD-MCNC: 1.09 MG/DL (ref 0.5–1)
GFR SERPL CREATININE-BSD FRML MDRD: 56 ML/MIN/1.73 (ref 39–90)
GLUCOSE BLD-MCNC: 75 MG/DL (ref 60–100)
GLUCOSE UR STRIP-MCNC: NEGATIVE MG/DL
HGB UR QL STRIP.AUTO: NEGATIVE
HYALINE CASTS UR QL AUTO: ABNORMAL /LPF
KETONES UR QL STRIP: NEGATIVE
LEUKOCYTE ESTERASE UR QL STRIP.AUTO: ABNORMAL
NITRITE UR QL STRIP: NEGATIVE
PH UR STRIP.AUTO: <=5 [PH] (ref 5–9)
POTASSIUM BLD-SCNC: 4 MMOL/L (ref 3.5–5.1)
PROT UR QL STRIP: NEGATIVE
RBC # UR: ABNORMAL /HPF
REF LAB TEST METHOD: ABNORMAL
SODIUM BLD-SCNC: 137 MMOL/L (ref 137–145)
SP GR UR STRIP: 1.02 (ref 1–1.03)
SQUAMOUS #/AREA URNS HPF: ABNORMAL /HPF
UROBILINOGEN UR QL STRIP: ABNORMAL
WBC UR QL AUTO: ABNORMAL /HPF

## 2018-01-20 PROCEDURE — G0378 HOSPITAL OBSERVATION PER HR: HCPCS

## 2018-01-20 PROCEDURE — 97530 THERAPEUTIC ACTIVITIES: CPT

## 2018-01-20 PROCEDURE — 25010000002 CEFTRIAXONE PER 250 MG: Performed by: INTERNAL MEDICINE

## 2018-01-20 PROCEDURE — 97110 THERAPEUTIC EXERCISES: CPT

## 2018-01-20 PROCEDURE — 96375 TX/PRO/DX INJ NEW DRUG ADDON: CPT

## 2018-01-20 PROCEDURE — 80048 BASIC METABOLIC PNL TOTAL CA: CPT | Performed by: INTERNAL MEDICINE

## 2018-01-20 RX ADMIN — LEVOBUNOLOL HYDROCHLORIDE 1 DROP: 5 SOLUTION/ DROPS OPHTHALMIC at 20:50

## 2018-01-20 RX ADMIN — LEVOBUNOLOL HYDROCHLORIDE 1 DROP: 5 SOLUTION/ DROPS OPHTHALMIC at 08:23

## 2018-01-20 RX ADMIN — LATANOPROST 1 DROP: 50 SOLUTION OPHTHALMIC at 20:50

## 2018-01-20 RX ADMIN — METOPROLOL SUCCINATE 25 MG: 25 TABLET, EXTENDED RELEASE ORAL at 08:22

## 2018-01-20 RX ADMIN — SODIUM CHLORIDE 1 G: 9 INJECTION INTRAMUSCULAR; INTRAVENOUS; SUBCUTANEOUS at 09:32

## 2018-01-20 NOTE — DISCHARGE PLACEMENT REQUEST
"Khushi Hilario (89 y.o. Female)     Date of Birth Social Security Number Address Home Phone MRN    02/03/1914  [place]  [address]  [place] [address] [phone] [id number]    [employer] Marital Status          Mandaen        Admission Date Admission Type Admitting Provider Attending Provider Department, Room/Bed    1/17/18 Emergency Albert Ko MD Williams, Kevin L, MD [address], 410/1    Discharge Date Discharge Disposition Discharge Destination                      Attending Provider: Albert Ko MD     Allergies:  Lisinopril, Alphagan P [Brimonidine Tartrate], Brimonidine, Diamox Sequels [Acetazolamide]    Isolation:  None   Infection:  None   Code Status:  Conditional    Ht:  144.8 cm (57\")   Wt:  52.1 kg (114 lb 14.4 oz)    Admission Cmt:  None   Principal Problem:  Generalized weakness [R53.1]                 Active Insurance as of 1/17/2018     Primary Coverage     Payor Plan Insurance Group Employer/Plan Group    [facility] MEDICARE REPLACEMENT [facility] [id number]     Payor Plan Address Payor Plan Phone Number Effective From Effective To    [address] [address]  1/1/2017     [place] [address]       Subscriber Name Subscriber Birth Date Member ID       KHUSHI HILARIO 2/3/1914 [id number]                 Emergency Contacts      (Rel.) Home Phone Work Phone Mobile Phone    Agustín Stanley (Relative) [phone] -- --    Adonis Kenney (Friend) [phone] -- --          "

## 2018-01-20 NOTE — PROGRESS NOTES
[facility] Medicine Services  INPATIENT PROGRESS NOTE     LOS: 0 days   Patient Care Team:  Katherine Mays MD as PCP - General    Chief Complaint:  Generalized weakness      Subjective     Interval History:    Patient is seen for follow-up today.  She is getting better today, tolerating prescribed diet and more awake and alert.  His son is at the bedside.       Patient Complaints: As above.    History taken from: Caregiver    Review of Systems:    Review of Systems   Constitutional: Positive for fatigue. Negative for activity change, appetite change, chills, diaphoresis and fever.   HENT: Negative for trouble swallowing and voice change.    Eyes: Negative for photophobia and visual disturbance.   Respiratory: Negative for cough, choking, chest tightness, shortness of breath, wheezing and stridor.    Cardiovascular: Negative for chest pain, palpitations and leg swelling.   Gastrointestinal: Negative for abdominal distention, abdominal pain, blood in stool, constipation, diarrhea, nausea and vomiting.   Endocrine: Negative for cold intolerance, heat intolerance, polydipsia, polyphagia and polyuria.   Genitourinary: Negative for decreased urine volume, difficulty urinating, dysuria, enuresis, flank pain, frequency, hematuria and urgency.   Musculoskeletal: Negative for arthralgias, gait problem, myalgias, neck pain and neck stiffness.   Skin: Negative for pallor, rash and wound.   Neurological: Negative for dizziness, tremors, seizures, syncope, facial asymmetry, speech difficulty, weakness, light-headedness, numbness and headaches.   Hematological: Does not bruise/bleed easily.   Psychiatric/Behavioral: Negative for agitation, behavioral problems and confusion.         Objective     Vital Signs  Temp:  [97.2 °F (36.2 °C)-99.6 °F (37.6 °C)] 98.4 °F (36.9 °C)  Heart Rate:  [61-70] 70  Resp:  [18] 18  BP: (106-141)/(50-63) 137/62    Physical Exam:   Physical Exam    Constitutional: She is oriented to person, place, and time. She appears well-developed and well-nourished. She is sleeping. She is easily aroused. No distress.   HENT:   Head: Normocephalic and atraumatic.   Right Ear: External ear normal.   Left Ear: External ear normal.   Nose: Nose normal.   Mouth/Throat: Oropharynx is clear and moist.   Eyes: Conjunctivae and EOM are normal. Pupils are equal, round, and reactive to light.   Neck: Normal range of motion. Neck supple. No JVD present. No thyromegaly present.   Cardiovascular: Normal rate, regular rhythm, normal heart sounds and intact distal pulses.  Exam reveals no gallop and no friction rub.    No murmur heard.  Pulmonary/Chest: Effort normal and breath sounds normal. No stridor. No respiratory distress. She has no wheezes. She has no rales. She exhibits no tenderness.   Abdominal: Soft. Bowel sounds are normal. She exhibits no distension and no mass. There is no tenderness. There is no rebound and no guarding. No hernia.   Musculoskeletal: Normal range of motion. She exhibits no edema, tenderness or deformity.   Neurological: She is oriented to person, place, and time and easily aroused. She has normal reflexes. She exhibits normal muscle tone.   Skin: Skin is warm and dry. No rash noted. She is not diaphoretic. No erythema. No pallor.   Psychiatric: She has a normal mood and affect. Her behavior is normal. Judgment and thought content normal.   Nursing note and vitals reviewed.         Results Review:         Results from last 7 days  Lab Units 01/20/18  0610 01/19/18  0618 01/18/18  0846 01/17/18  1733   SODIUM mmol/L 137 137 137 138   POTASSIUM mmol/L 4.0 4.6 4.3 4.4   CHLORIDE mmol/L 113* 106 105 101   CO2 mmol/L 18.0* 23.0 24.0 25.0   BUN mg/dL 24* 31* 34* 42*   CREATININE mg/dL 1.09* 1.33* 1.41* 1.58*   GLUCOSE mg/dL 75 118* 129* 134*   CALCIUM mg/dL 7.8* 8.6 8.8 9.2   BILIRUBIN mg/dL  --   --   --  0.9   ALK PHOS U/L  --   --   --  81   ALT (SGPT) U/L   --   --   --  19   AST (SGOT) U/L  --   --   --  30               Results from last 7 days  Lab Units 01/17/18  1733   WBC 10*3/mm3 9.05   HEMOGLOBIN g/dL 11.1*   HEMATOCRIT % 33.1*   PLATELETS 10*3/mm3 168       No results found for: CKTOTAL, CKMB, CKMBINDEX, TROPONINI, TROPONINT    CO2   Date Value Ref Range Status   01/20/2018 18.0 (L) 22.0 - 31.0 mmol/L Final         Results from last 7 days  Lab Units 01/17/18  1733   INR  1.12        Imaging Results (last 7 days)     Procedure Component Value Units Date/Time    XR Shoulder 2+ View Left [[id number]] Collected:  01/17/18 1736     Updated:  01/17/18 1810    Narrative:         Three view left shoulder    HISTORY: Pain after falling    AP films with the humerus in internal and external rotation and  scapular Y view were obtained.    COMPARISON: None    No fracture or dislocation.  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    Degenerative changes in the thoracic spine.  Old granulomatous disease in the chest.      Impression:       CONCLUSION:  No fracture or dislocation.  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    [id number]    Electronically signed by:  Kevin Hernandez MD  1/17/2018 6:09 PM CST  Workstation: [id number]    XR Humerus Left [[id number]] Collected:  01/17/18 1736     Updated:  01/17/18 1812    Narrative:         Two view left humerus    HISTORY: Pain after falling    AP and lateral views obtained.    COMPARISON: None    No fracture or dislocation.  Advanced degenerative change glenohumeral joint with probable  secondary osteonecrosis of the humeral head and possible synovial  osteochondromatosis.      Impression:       CONCLUSION:  No fracture or dislocation    [id number]    Electronically signed by:  Kevin Hernandez MD  1/17/2018 6:11 PM  CST  Workstation: [email or web address]    XR Pelvis 1 or 2 View [[id number]] Collected:  01/17/18 1736     Updated:  01/17/18 1820    Narrative:       PROCEDURE: XR PELVIS 1 OR 2 VW    VIEWS: 1    INDICATION: Pain    COMPARISON: None    FINDINGS:     - fracture: None    - alignment: Within normal limits    - misc: Age-related degenerative changes. Diffusely decreased  osseous mineralization. Degenerative changes are present in lower  lumbar spine. Irregular calcifications in the pelvis probably  represents a fibroid.          Impression:       No acute osseous abnormality identified..      Note:  if pain or symptoms persist beyond reasonable expectations    and follow-up imaging is anticipated,  cross sectional imaging   (CT and/or MRI) is suggested, as is deemed clinically   appropriate.    Electronically signed by:  Katherine Ashford MD  1/17/2018 6:19 PM CST  Workstation: [id number]    XR Chest 1 View [[id number]] Collected:  01/17/18 1736     Updated:  01/17/18 1822    Narrative:       PROCEDURE: XR CHEST 1 VW    VIEWS:Single    INDICATION: Dyspnea    COMPARISON: Acute abdominal series: 1/24/2017    FINDINGS:       - lines/tubes: None    - cardiac: Borderline size    - mediastinum: Atherosclerotic calcification and aortic  tortuosity.     - lungs: No evidence of a focal air space process, pulmonary  interstitial edema, nodule(s)/mass.     - pleura: No evidence of  fluid.      - osseous: Degenerative changes of the bilateral shoulders are  stable.      Impression:       No acute cardiopulmonary process identified.      Electronically signed by:  Katherine Ashford MD  1/17/2018 6:21 PM CST  Workstation: [id number]    CT Head Without Contrast [[id number]] Collected:  01/17/18 1801     Updated:  01/17/18 1841    Narrative:       EXAM DESCRIPTION: CT HEAD WO CONTRAST    CLINICAL HISTORY:  head injury       COMPARISON: No studies available for comparison. Report of exam  5/22/2007 is reviewed.    DOSE LENGTH PRODUCT: 971.10    CONTRAST:  None    TECHNIQUE:    Axial images from skull base to vertex.    This exam was performed according to our departmental dose  optimization program, which includes automated exposure control,  adjustment of the mA and/or KV according to patient size and/or  use of iterative reconstruction technique.    FINDINGS:  No Chiari malformation.  Extra-axial spaces: Appropriate for patient age and degree of  volume loss.    Intracranial hemorrhage: No evidence of intracranial hemorrhage  or suspicious extra-axial fluid collections.  Ventricular system: No hydrocephalus.   Basal cisterns: Patent.   Cerebral parenchyma: No edema, midline shift, or mass effect.  Gray-white differentiation is maintained. There is mild-moderate  volume loss with associated change. There is sequela chronic  microvascular disease.. She reported symmetric benign  calcification is again seen within the basal ganglia and the  cerebellar hemispheres.    Midline shift: None.    Cerebellum: No acute or suspicious finding.   Brainstem : Unremarkable CT appearance.   Calvarium: No evidence of calvarial fracture.    Vascular system: Vascular calcification, otherwise unremarkable  noncontrast appearance.    Paranasal sinuses and mastoid air cells: No air-fluid levels or  significant mucosal thickening within the included sinuses. The  mastoids and middle ear cavities are clear.    Visualized orbits: No acute findings.    Visualized upper cervical spine: Not included.   Sella: Unremarkable.    Skull base: Unremarkable.     ADDITIONAL FINDINGS: There is a left posterior parietal scalp  contusion/hematoma.      Impression:       Left parietal scalp injury without underlying calvarial fracture,  intracranial hemorrhage, mass effect, or acute large vessel  distribution infarct.    Mild-moderate volume loss and sequela chronic microvascular  disease. Benign cerebral and cerebellar mineralization. The  latter finding was reported on the prior exam from  2017.    Electronically signed by:  Ryder Marcano MD  1/17/2018 6:40 PM  CST Workstation: [id number]    CT Cervical Spine Without Contrast [[id number]] Collected:  01/17/18 1801     Updated:  01/17/18 1849    Narrative:       EXAM DESCRIPTION: CT CERVICAL SPINE WO CONTRAST    CLINICAL HISTORY: head injury    COMPARISON: None      TECHNIQUE: Multiple contiguous noncontrast axial images are  obtained of the cervical spine. Coronal and sagittal multiplanar  reformatted images are also reviewed.   This exam was performed according to our departmental dose  optimization program, which includes automated exposure control,  adjustment of the mA and/or KV according to patient size and/or  use of iterative reconstruction technique.    DLP: 971.10     FINDINGS:   No evidence of prevertebral thickening or suspicious fluid  collection. The included lung apices are unremarkable for acute  finding. The included skull base, mastoids and paranasal sinuses  are unremarkable. There is incidental note made of large torus  palatini.    There is generalized demineralization.    There is approximate 2 mm degenerative anterolisthesis of C3 upon  C4 and retrolisthesis of C5 upon C6. Otherwise there is  straightening and reversal of normal curvature with anatomic  alignment.  No evidence of compression fracture deformity. The craniocervical  articulations are intact. There is arthritic changes at C1-C2  however no widening of the atlantodental interval. The lateral  masses are appropriately positioned. No fracture of the dens  identified. Normal alignment of the posterior facets. No fracture  or perched facet. Arthritic changes are present greater on the  left.    Multilevel disc space narrowing, greatest at C4-5 and C5-6.    C4-5: There is mild disc osteophyte complex with left greater  than right uncovertebral posterior facet hypertrophy. There is  severe left and moderate right foraminal stenosis.    C5-6: There is disc osteophyte complex  contributing to mild  central spinal canal stenosis. Uncovertebral and posterior facet  hypertrophy contribute to bilateral severe foraminal stenosis.    C6-7: Uncovertebral and posterior facet hypertrophy on the left  contributing to moderate foraminal stenosis.      Impression:       No acute fracture or subluxation of the cervical spine.    Demineralization and multilevel degenerative changes of the  cervical spine as detailed above.    Electronically signed by:  Ryder Marcano MD  1/17/2018 6:47 PM  CST Workstation: [id number]                                    Medication Review:   Current Facility-Administered Medications   Medication Dose Route Frequency Provider Last Rate Last Dose   • cefTRIAXone (ROCEPHIN) in NS 1 gram/10ml IV PUSH syringe  1 g Intravenous Q24H Lyle Lane MD   1 g at 01/20/18 0932   • latanoprost (XALATAN) 0.005 % ophthalmic solution 1 drop  1 drop Both Eyes Nightly Marvel Boyd MD   1 drop at 01/19/18 2022   • levobunolol (BETAGAN) 0.5 % ophthalmic solution 1 drop  1 drop Both Eyes BID Marvel Boyd MD   1 drop at 01/20/18 0823   • metoprolol succinate XL (TOPROL-XL) 24 hr tablet 25 mg  25 mg Oral Daily Marvel Boyd MD   25 mg at 01/20/18 0822   • polyethylene glycol (MIRALAX) powder 17 g  17 g Oral Daily PRN Marvel Boyd MD       • sodium chloride 0.9 % flush 1-10 mL  1-10 mL Intravenous PRN Marvel Boyd MD             Assessment/Plan     Principal Problem:    Generalized weakness  Active Problems:    Scalp laceration    Hypertension    Frequent falls    Dehydration  1.  Acute kidney injury: Resolved as creatinine is down from 1.09 today.    2.  UTI: Begin IV antibiotics pending outcome of urine cultures.  3.  Deconditioning: Continue PT and OT.  4.  Continue GI and DVT prophylaxis.  5.  Discharge planning is ongoing.  Did speak with the son regarding disposition plans and the need for patient's placement at the nursing home.  The son is agreeable to the plan and wishes to  meet with case management.            Lyle Lane MD  01/20/18      EMR Dragon/Transcription disclaimer:   Much of this encounter note is an electronic transcription/translation of spoken language to printed text. The electronic translation of spoken language may permit erroneous, or at times, nonsensical words or phrases to be inadvertently transcribed; Although I have reviewed the note for such errors, some may still exist.

## 2018-01-20 NOTE — PLAN OF CARE
Problem: Patient Care Overview (Adult)  Goal: Plan of Care Review  Outcome: Ongoing (interventions implemented as appropriate)   01/20/18 0919 01/20/18 1023   Coping/Psychosocial Response Interventions   Plan Of Care Reviewed With --  patient   Patient Care Overview   Progress --  improving   Outcome Evaluation   Outcome Summary/Follow up Plan pt sup<>sit with CGA, pt sit<>stand with min assist of 1, pt stepped >HOB with mod assist (HHA) of 1, pt will require 24/7 care & continued PT services @ D/C --        Problem: Inpatient Physical Therapy  Goal: Bed Mobility Goal LTG- PT  Outcome: Outcome(s) achieved Date Met: 01/20/18 01/18/18 1512 01/20/18 0919   Bed Mobility PT LTG   Bed Mobility PT LTG, Date Established 01/18/18 --    Bed Mobility PT LTG, Time to Achieve by discharge --    Bed Mobility PT LTG, Activity Type all bed mobility --    Bed Mobility PT LTG, Dickens Level minimum assist (75% patient effort) --    Bed Mobility PT LTG, Outcome --  goal met     Goal: Transfer Training Goal 1 LTG- PT  Outcome: Ongoing (interventions implemented as appropriate)   01/18/18 1512 01/20/18 0919   Transfer Training PT LTG   Transfer Training PT LTG, Date Established 01/18/18 --    Transfer Training PT LTG, Time to Achieve by discharge --    Transfer Training PT LTG, Dickens Level minimum assist (75% patient effort) --    Transfer Training PT LTG, Assist Device walker, rolling --    Transfer Training PT LTG, Outcome --  goal ongoing     Goal: Gait Training Goal LTG- PT  Outcome: Ongoing (interventions implemented as appropriate)   01/18/18 1512 01/20/18 0919   Gait Training PT LTG   Gait Training Goal PT LTG, Date Established 01/18/18 --    Gait Training Goal PT LTG, Time to Achieve by discharge --    Gait Training Goal PT LTG, Dickens Level minimum assist (75% patient effort) --    Gait Training Goal PT LTG, Assist Device walker, rolling --    Gait Training Goal PT LTG, Distance to Achieve 50 feet --    Gait  Training Goal PT LTG, Outcome --  goal ongoing

## 2018-01-20 NOTE — PLAN OF CARE
Problem: Patient Care Overview (Adult)  Goal: Plan of Care Review  Outcome: Ongoing (interventions implemented as appropriate)   01/20/18 1023   Coping/Psychosocial Response Interventions   Plan Of Care Reviewed With patient   Patient Care Overview   Progress improving   Outcome Evaluation   Outcome Summary/Follow up Plan Pt has demonstrated increased alertness, pt had 50% intake at breakfast, previous shift pt would not eat, Pt's son spoke to Dr. Lane in regards to nursing home placement at time of discharge     Goal: Adult Individualization and Mutuality  Outcome: Ongoing (interventions implemented as appropriate)    Goal: Discharge Needs Assessment  Outcome: Ongoing (interventions implemented as appropriate)      Problem: Fluid Volume Deficit (Adult)  Goal: Fluid/Electrolyte Balance  Outcome: Ongoing (interventions implemented as appropriate)    Goal: Comfort/Well Being  Outcome: Ongoing (interventions implemented as appropriate)      Problem: Fall Risk (Adult)  Goal: Absence of Falls  Outcome: Ongoing (interventions implemented as appropriate)      Problem: Pressure Ulcer Risk (Anup Scale) (Adult,Obstetrics,Pediatric)  Goal: Skin Integrity  Outcome: Ongoing (interventions implemented as appropriate)

## 2018-01-20 NOTE — THERAPY TREATMENT NOTE
Acute Care - Physical Therapy Treatment Note  [facility]     Patient Name: Radha Frias [facility]  : 2/3/1914  MRN: [id number]  Today's Date: 2018  Onset of Illness/Injury or Date of Surgery Date: 18  Date of Referral to PT: 18  Referring Physician: Dr. Lane    Admit Date: 2018    Visit Dx:    ICD-10-CM ICD-9-CM   1. Laceration of scalp, initial encounter S01.01XA 873.0   2. Osteoarthritis of left shoulder, unspecified osteoarthritis type M19.012 715.91   3. Chronic kidney disease, unspecified CKD stage N18.9 585.9   4. Dehydration E86.0 276.51   5. Left bundle branch block I44.7 426.3   6. Impaired functional mobility, balance, gait, and endurance Z74.09 V49.89     Patient Active Problem List   Diagnosis   • Leg pain, right   • Pseudophakia   • Borderline glaucoma   • Primary open angle glaucoma of both eyes, mild stage   • Scalp laceration   • Hypertension   • Frequent falls   • Generalized weakness   • Dehydration               Adult Rehabilitation Note       18 0919          Rehab Assessment/Intervention    Discipline physical therapy assistant  -TA      Document Type therapy note (daily note)  -TA      Subjective Information agree to therapy;complains of;dizziness  -TA      Patient Effort, Rehab Treatment good  -TA      Precautions/Limitations fall precautions  -TA      Patient Response to Treatment pt stated, she stays dizzy when she is up  -TA      Recorded by [TA] Kenyatta Madden PTA      Vital Signs    Pre Systolic BP Rehab 100  -TA      Pre Treatment Diastolic BP 50  -TA      Post Systolic BP Rehab 100  -TA      Post Treatment Diastolic BP 54  -TA      Pretreatment Heart Rate (beats/min) 61  -TA      Posttreatment Heart Rate (beats/min) 72  -TA      Pre SpO2 (%) 99  -TA      O2 Delivery Pre Treatment supplemental O2  -TA      Post SpO2 (%) 99  -TA      Pre Patient Position Supine  -TA      Post Patient Position Supine  -TA      Recorded by [TA] Kenyatta Madden PTA       Pain Assessment    Pain Assessment No/denies pain  -TA      Recorded by [TA] Kenyatta Madden PTA      Cognitive Assessment/Intervention    Current Cognitive/Communication Assessment impaired  -TA      Orientation Status oriented to;person;situation  -TA      Follows Commands/Answers Questions 50% of the time  -TA      Personal Safety unable/difficult to assess  -TA      Recorded by [TA] Kenyatta Madden PTA      Bed Mobility, Assessment/Treatment    Bed Mobility, Assistive Device bed rails;head of bed elevated  -TA      Bed Mobility, Roll Left, [place] contact guard assist  -TA      Bed Mobility, Roll Right, [place] contact guard assist  -TA      Bed Mobility, Scoot/Bridge, [place] contact guard assist  -TA      Bed Mob, Supine to Sit, [place] minimum assist (75% patient effort)  -TA      Bed Mob, Sit to Supine, [place] minimum assist (75% patient effort)  -TA      Bed Mobility, Comment pt sat @ EOB 15 minutes with CGA-SBA of 1  -TA      Recorded by [TA] Kenyatta Madden PTA      Transfer Assessment/Treatment    Transfers, Sit-Stand [place] minimum assist (75% patient effort)  -TA      Transfers, Stand-Sit [place] minimum assist (75% patient effort)  -TA      Transfers, Sit-Stand-Sit, Assist Device other (see comments)   HHA  -TA      Recorded by [TA] Kenyatta Madden PTA      Gait Assessment/Treatment    Gait, [place] Level moderate assist (50% patient effort)  -TA      Gait, Assistive Device other (see comments)   HHA  -TA      Gait, Distance (Feet) 5   >HOB  -TA      Recorded by [TA] Kenyatta Madden PTA      Therapy Exercises    Bilateral Lower Extremities AROM:;10 reps;supine;ankle pumps/circles;glut sets;quad sets;sitting;LAQ  -TA      Recorded by [TA] Kenyatta Madden PTA      Positioning and Restraints    Pre-Treatment Position in bed  -TA      Post Treatment Position bed  -TA      In Bed supine;call light within reach;exit alarm on;with family/caregiver;R heel elevated;L  heel elevated  -TA      Recorded by [TA] Kenyatta Madden PTA        User Key  (r) = Recorded By, (t) = Taken By, (c) = Cosigned By    Initials Name Effective Dates    RAQUEL Madden PTA 10/17/16 -                 IP PT Goals       01/20/18 0919 01/18/18 1512       Bed Mobility PT LTG    Bed Mobility PT LTG, Date Established  01/18/18  -MN (r) HL (t) MN (c)     Bed Mobility PT LTG, Time to Achieve  by discharge  -MN (r) HL (t) MN (c)     Bed Mobility PT LTG, Activity Type  all bed mobility  -MN (r) HL (t) MN (c)     Bed Mobility PT LTG, [place] Level  minimum assist (75% patient effort)  -MN (r) HL (t) MN (c)     Bed Mobility PT LTG, Outcome goal met  -TA      Transfer Training PT LTG    Transfer Training PT LTG, Date Established  01/18/18  -MN (r) HL (t) MN (c)     Transfer Training PT LTG, Time to Achieve  by discharge  -MN (r) HL (t) MN (c)     Transfer Training PT LTG, [place] Level  minimum assist (75% patient effort)  -MN (r) HL (t) MN (c)     Transfer Training PT LTG, Assist Device  walker, rolling  -MN (r) HL (t) MN (c)     Transfer Training PT LTG, Outcome goal ongoing  -TA      Gait Training PT LTG    Gait Training Goal PT LTG, Date Established  01/18/18  -MN (r) HL (t) MN (c)     Gait Training Goal PT LTG, Time to Achieve  by discharge  -MN (r) HL (t) MN (c)     Gait Training Goal PT LTG, [place] Level  minimum assist (75% patient effort)  -MN (r) HL (t) MN (c)     Gait Training Goal PT LTG, Assist Device  walker, rolling  -MN (r) HL (t) MN (c)     Gait Training Goal PT LTG, Distance to Achieve  50 feet  -MN (r) HL (t) MN (c)     Gait Training Goal PT LTG, Outcome goal ongoing  -TA        User Key  (r) = Recorded By, (t) = Taken By, (c) = Cosigned By    Initials Name Provider Type    MN Elvira Dean, PT Physical Therapist    RAQUEL Madden, PTA Physical Therapy Assistant    KANA Heath, PT Student PT Student                  PT Recommendation and Plan  Anticipated Equipment  Needs At Discharge:  (none)  Anticipated Discharge Disposition: assisted living, home with 24/7 care  Planned Therapy Interventions: bed mobility training, gait training, home exercise program, balance training, patient/family education, strengthening, stair training, ROM (Range of Motion), stretching  PT Frequency: other (see comments) (5-14x/week)  Plan of Care Review  Outcome Summary/Follow up Plan: pt sup<>sit with CGA, pt sit<>stand with min assist of 1, pt stepped >HOB with mod assist  (HHA) of 1, pt will require 24/7 care & continued PT services @ D/C          Outcome Measures       01/20/18 1100 01/18/18 4079       How much help from another person do you currently need...    Turning from your back to your side while in flat bed without using bedrails? 3  -TA 3  -MN (r) HL (t) MN (c)     Moving from lying on back to sitting on the side of a flat bed without bedrails? 3  -TA 3  -MN (r) HL (t) MN (c)     Moving to and from a bed to a chair (including a wheelchair)? 3  -TA 3  -MN (r) HL (t) MN (c)     Standing up from a chair using your arms (e.g., wheelchair, bedside chair)? 3  -TA 3  -MN (r) HL (t) MN (c)     Climbing 3-5 steps with a railing? 2  -TA 2  -MN (r) HL (t) MN (c)     To walk in hospital room? 2  -TA 3  -MN (r) HL (t) MN (c)     AM-PAC 6 Clicks Score 16  -TA 17  -MN (r) HL (t)     Functional Assessment    Outcome Measure Options AM-PAC 6 Clicks Basic Mobility (PT)  -TA AM-PAC 6 Clicks Basic Mobility (PT)  -MN (r) HL (t) MN (c)       User Key  (r) = Recorded By, (t) = Taken By, (c) = Cosigned By    Initials Name Provider Type    DIAN Dean, PT Physical Therapist    TA Kenyatta Madden, PTA Physical Therapy Assistant    HL Natalie Heath, PT Student PT Student           Time Calculation:         PT Charges       01/20/18 1149          Time Calculation    Start Time 0919  -TA      Stop Time 1000  -TA      Time Calculation (min) 41 min  -TA      Time Calculation- PT    Total Timed Code Minutes- PT  41 minute(s)  -TA        User Key  (r) = Recorded By, (t) = Taken By, (c) = Cosigned By    Initials Name Provider Type    RAQUEL Madden PTA Physical Therapy Assistant          Therapy Charges for Today     Code Description Service Date Service Provider Modifiers Qty    91624728834 HC PT THERAPEUTIC ACT EA 15 MIN 1/20/2018 Kenyatta Madden PTA GP 2    80699464428 HC PT THER PROC EA 15 MIN 1/20/2018 Kenyatta Madden PTA GP 1          PT G-Codes  PT Professional Judgement Used?: Yes  Outcome Measure Options: AM-PAC 6 Clicks Basic Mobility (PT)  Score: 17  Functional Limitation: Mobility: Walking and moving around  Mobility: Walking and Moving Around Current Status (): At least 40 percent but less than 60 percent impaired, limited or restricted  Mobility: Walking and Moving Around Goal Status (): At least 1 percent but less than 20 percent impaired, limited or restricted    Kenyatta Madden PTA  1/20/2018

## 2018-01-20 NOTE — PLAN OF CARE
Problem: Patient Care Overview (Adult)  Goal: Plan of Care Review  Outcome: Ongoing (interventions implemented as appropriate)   01/20/18 0410   Coping/Psychosocial Response Interventions   Plan Of Care Reviewed With patient   Patient Care Overview   Progress no change   Outcome Evaluation   Outcome Summary/Follow up Plan Pt has rested most of this shift. VSS. No new concerns.

## 2018-01-21 LAB
ANION GAP SERPL CALCULATED.3IONS-SCNC: 8 MMOL/L (ref 5–15)
BACTERIA SPEC AEROBE CULT: NORMAL
BACTERIA SPEC AEROBE CULT: NORMAL
BUN BLD-MCNC: 27 MG/DL (ref 7–21)
BUN/CREAT SERPL: 23.5 (ref 7–25)
CALCIUM SPEC-SCNC: 8 MG/DL (ref 8.4–10.2)
CHLORIDE SERPL-SCNC: 111 MMOL/L (ref 95–110)
CO2 SERPL-SCNC: 20 MMOL/L (ref 22–31)
CREAT BLD-MCNC: 1.15 MG/DL (ref 0.5–1)
GFR SERPL CREATININE-BSD FRML MDRD: 52 ML/MIN/1.73 (ref 39–90)
GLUCOSE BLD-MCNC: 85 MG/DL (ref 60–100)
POTASSIUM BLD-SCNC: 3.9 MMOL/L (ref 3.5–5.1)
SODIUM BLD-SCNC: 139 MMOL/L (ref 137–145)

## 2018-01-21 PROCEDURE — G0378 HOSPITAL OBSERVATION PER HR: HCPCS

## 2018-01-21 PROCEDURE — 25010000002 CEFTRIAXONE PER 250 MG: Performed by: INTERNAL MEDICINE

## 2018-01-21 PROCEDURE — 80048 BASIC METABOLIC PNL TOTAL CA: CPT | Performed by: INTERNAL MEDICINE

## 2018-01-21 PROCEDURE — 96376 TX/PRO/DX INJ SAME DRUG ADON: CPT

## 2018-01-21 PROCEDURE — 97530 THERAPEUTIC ACTIVITIES: CPT

## 2018-01-21 PROCEDURE — 97110 THERAPEUTIC EXERCISES: CPT

## 2018-01-21 RX ORDER — SODIUM CHLORIDE 9 MG/ML
50 INJECTION, SOLUTION INTRAVENOUS CONTINUOUS
Status: DISCONTINUED | OUTPATIENT
Start: 2018-01-21 | End: 2018-01-23

## 2018-01-21 RX ADMIN — LEVOBUNOLOL HYDROCHLORIDE 1 DROP: 5 SOLUTION/ DROPS OPHTHALMIC at 21:00

## 2018-01-21 RX ADMIN — METOPROLOL SUCCINATE 25 MG: 25 TABLET, EXTENDED RELEASE ORAL at 09:05

## 2018-01-21 RX ADMIN — LEVOBUNOLOL HYDROCHLORIDE 1 DROP: 5 SOLUTION/ DROPS OPHTHALMIC at 10:07

## 2018-01-21 RX ADMIN — LATANOPROST 1 DROP: 50 SOLUTION OPHTHALMIC at 21:00

## 2018-01-21 RX ADMIN — SODIUM CHLORIDE 1 G: 9 INJECTION INTRAMUSCULAR; INTRAVENOUS; SUBCUTANEOUS at 11:23

## 2018-01-21 RX ADMIN — SODIUM CHLORIDE 50 ML/HR: 900 INJECTION, SOLUTION INTRAVENOUS at 15:51

## 2018-01-21 NOTE — PLAN OF CARE
Problem: Patient Care Overview (Adult)  Goal: Plan of Care Review  Outcome: Ongoing (interventions implemented as appropriate)   01/21/18 0132   Coping/Psychosocial Response Interventions   Plan Of Care Reviewed With patient   Patient Care Overview   Progress improving   Outcome Evaluation   Outcome Summary/Follow up Plan Pt has rested most of this shift. VSS. No new complaints.     Goal: Adult Individualization and Mutuality  Outcome: Ongoing (interventions implemented as appropriate)    Goal: Discharge Needs Assessment  Outcome: Ongoing (interventions implemented as appropriate)      Problem: Fluid Volume Deficit (Adult)  Goal: Fluid/Electrolyte Balance  Outcome: Ongoing (interventions implemented as appropriate)    Goal: Comfort/Well Being  Outcome: Ongoing (interventions implemented as appropriate)      Problem: Fall Risk (Adult)  Goal: Absence of Falls  Outcome: Ongoing (interventions implemented as appropriate)      Problem: Pressure Ulcer Risk (Anup Scale) (Adult,Obstetrics,Pediatric)  Goal: Skin Integrity  Outcome: Ongoing (interventions implemented as appropriate)

## 2018-01-21 NOTE — PLAN OF CARE
Problem: Patient Care Overview (Adult)  Goal: Plan of Care Review  Outcome: Ongoing (interventions implemented as appropriate)   01/21/18 0132 01/21/18 0910 01/21/18 0956   Coping/Psychosocial Response Interventions   Plan Of Care Reviewed With --  patient --    Patient Care Overview   Progress improving --  --    Outcome Evaluation   Outcome Summary/Follow up Plan --  --  pt sup<>sit with CGA-min assist, pt sit<>stand with min assist, pt stepped to HOB with min assist, pt will require 24/7 care & continued PT services @ D/C       Problem: Inpatient Physical Therapy  Goal: Transfer Training Goal 1 LTG- PT  Outcome: Ongoing (interventions implemented as appropriate)    Goal: Gait Training Goal LTG- PT  Outcome: Ongoing (interventions implemented as appropriate)   01/18/18 1512 01/20/18 0919   Gait Training PT LTG   Gait Training Goal PT LTG, Date Established 01/18/18 --    Gait Training Goal PT LTG, Time to Achieve by discharge --    Gait Training Goal PT LTG, [place] Level minimum assist (75% patient effort) --    Gait Training Goal PT LTG, Assist Device walker, rolling --    Gait Training Goal PT LTG, Distance to Achieve 50 feet --    Gait Training Goal PT LTG, Outcome --  goal ongoing

## 2018-01-21 NOTE — THERAPY TREATMENT NOTE
Acute Care - Physical Therapy Treatment Note  [facility]     Patient Name: Radha Frias [facility]  : 2/3/1914  MRN: [id number]  Today's Date: 2018  Onset of Illness/Injury or Date of Surgery Date: 18  Date of Referral to PT: 18  Referring Physician: Dr. Lane    Admit Date: 2018    Visit Dx:    ICD-10-CM ICD-9-CM   1. Laceration of scalp, initial encounter S01.01XA 873.0   2. Osteoarthritis of left shoulder, unspecified osteoarthritis type M19.012 715.91   3. Chronic kidney disease, unspecified CKD stage N18.9 585.9   4. Dehydration E86.0 276.51   5. Left bundle branch block I44.7 426.3   6. Impaired functional mobility, balance, gait, and endurance Z74.09 V49.89     Patient Active Problem List   Diagnosis   • Leg pain, right   • Pseudophakia   • Borderline glaucoma   • Primary open angle glaucoma of both eyes, mild stage   • Scalp laceration   • Hypertension   • Frequent falls   • Generalized weakness   • Dehydration               Adult Rehabilitation Note       18 0956 18 0919       Rehab Assessment/Intervention    Discipline physical therapy assistant  -TA physical therapy assistant  -TA     Document Type therapy note (daily note)  -TA therapy note (daily note)  -TA     Subjective Information agree to therapy  -TA agree to therapy;complains of;dizziness  -TA     Patient Effort, Rehab Treatment good  -TA good  -TA     Precautions/Limitations fall precautions  -TA fall precautions  -TA     Patient Response to Treatment  pt stated, she stays dizzy when she is up  -TA     Recorded by [TA] Kenyatta Madden PTA [TA] Kenyatta Madden PTA     Vital Signs    Pre Systolic BP Rehab 149  -  -TA     Pre Treatment Diastolic BP 97  -TA 50  -TA     Post Systolic BP Rehab 161  -  -TA     Post Treatment Diastolic BP 70  -TA 54  -TA     Pretreatment Heart Rate (beats/min) 72  -TA 61  -TA     Intratreatment Heart Rate (beats/min) 70  -TA      Posttreatment Heart Rate (beats/min)  75  -TA 72  -TA     Pre SpO2 (%) 98  -TA 99  -TA     O2 Delivery Pre Treatment room air  -TA supplemental O2  -TA     Intra SpO2 (%) 97  -TA      Post SpO2 (%) 99  -TA 99  -TA     Pre Patient Position Supine  -TA Supine  -TA     Post Patient Position Supine  -TA Supine  -TA     Recorded by [TA] Kenyatta Madden, MARGARITO [TA] Kenyatta Madden PTA     Pain Assessment    Pain Assessment No/denies pain  -TA No/denies pain  -TA     Recorded by [TA] Kenyatta Madden, MARGARITO [TA] Kenyatta Madden PTA     Cognitive Assessment/Intervention    Current Cognitive/Communication Assessment impaired  -TA impaired  -TA     Orientation Status oriented to;person;situation  -TA oriented to;person;situation  -TA     Follows Commands/Answers Questions 75% of the time  -TA 50% of the time  -TA     Personal Safety decreased awareness, need for safety  -TA unable/difficult to assess  -TA     Recorded by [TA] Kenyatta Madden, MARGARITO [TA] Kenyatta Madden PTA     Bed Mobility, Assessment/Treatment    Bed Mobility, Assistive Device bed rails;head of bed elevated  -TA bed rails;head of bed elevated  -TA     Bed Mobility, Roll Left, [place] supervision required  -TA contact guard assist  -TA     Bed Mobility, Roll Right, [place] supervision required  -TA contact guard assist  -TA     Bed Mobility, Scoot/Bridge, [place] supervision required  -TA contact guard assist  -TA     Bed Mob, Supine to Sit, [place] contact guard assist  -TA minimum assist (75% patient effort)  -TA     Bed Mob, Sit to Supine, [place] minimum assist (75% patient effort)  -TA minimum assist (75% patient effort)  -TA     Bed Mobility, Comment pt sat @ EOB 15m minutes with SBA  -TA pt sat @ EOB 15 minutes with CGA-SBA of 1  -TA     Recorded by [TA] Kenyatta Madden, MARGARITO [TA] Kenyatta Madden PTA     Transfer Assessment/Treatment    Transfers, Sit-Stand [place] minimum assist (75% patient effort)  -TA minimum assist (75% patient effort)  -TA     Transfers, Stand-Sit  [place] minimum assist (75% patient effort)  -TA minimum assist (75% patient effort)  -TA     Transfers, Sit-Stand-Sit, Assist Device other (see comments)   HHA  -TA other (see comments)   HHA  -TA     Recorded by [TA] Kenyatta Madden PTA [TA] Kenyatta Madden PTA     Gait Assessment/Treatment    Gait, [place] Level minimum assist (75% patient effort)  -TA moderate assist (50% patient effort)  -TA     Gait, Assistive Device other (see comments)   HHA  -TA other (see comments)   HHA  -TA     Gait, Distance (Feet) 5  -TA 5   >HOB  -TA     Recorded by [TA] Kenyatta Madden PTA [TA] Kenyatta Madden PTA     Therapy Exercises    Bilateral Lower Extremities AROM:;15 reps;supine;ankle pumps/circles;quad sets;hip abduction/adduction;heel slides;sitting;LAQ  -TA AROM:;10 reps;supine;ankle pumps/circles;glut sets;quad sets;sitting;LAQ  -TA     Recorded by [RAQUEL] Kenyatta Madden PTA [TA] Kenyatta Madden PTA     Positioning and Restraints    Pre-Treatment Position in bed  -TA in bed  -TA     Post Treatment Position bed  -TA bed  -TA     In Bed supine;call light within reach;exit alarm on  -TA supine;call light within reach;exit alarm on;with family/caregiver;R heel elevated;L heel elevated  -TA     Recorded by [RAQUEL] Kenyatta Madden PTA [TA] Kenyatta Madden PTA       User Key  (r) = Recorded By, (t) = Taken By, (c) = Cosigned By    Initials Name Effective Dates    RAQUEL Madden PTA 10/17/16 -                 IP PT Goals       01/20/18 0919 01/18/18 1512       Bed Mobility PT LTG    Bed Mobility PT LTG, Date Established  01/18/18  -MN (r) HL (t) MN (c)     Bed Mobility PT LTG, Time to Achieve  by discharge  -MN (r) HL (t) MN (c)     Bed Mobility PT LTG, Activity Type  all bed mobility  -MN (r) HL (t) MN (c)     Bed Mobility PT LTG, [place] Level  minimum assist (75% patient effort)  -MN (r) HL (t) MN (c)     Bed Mobility PT LTG, Outcome goal met  -TA      Transfer Training PT LTG    Transfer Training PT LTG, Date  Established  01/18/18  -MN (r) HL (t) MN (c)     Transfer Training PT LTG, Time to Achieve  by discharge  -MN (r) HL (t) MN (c)     Transfer Training PT LTG, [place] Level  minimum assist (75% patient effort)  -MN (r) HL (t) MN (c)     Transfer Training PT LTG, Assist Device  walker, rolling  -MN (r) HL (t) MN (c)     Transfer Training PT LTG, Outcome goal ongoing  -TA      Gait Training PT LTG    Gait Training Goal PT LTG, Date Established  01/18/18  -MN (r) HL (t) MN (c)     Gait Training Goal PT LTG, Time to Achieve  by discharge  -MN (r) HL (t) MN (c)     Gait Training Goal PT LTG, [place] Level  minimum assist (75% patient effort)  -MN (r) HL (t) MN (c)     Gait Training Goal PT LTG, Assist Device  walker, rolling  -MN (r) HL (t) MN (c)     Gait Training Goal PT LTG, Distance to Achieve  50 feet  -MN (r) HL (t) MN (c)     Gait Training Goal PT LTG, Outcome goal ongoing  -TA        User Key  (r) = Recorded By, (t) = Taken By, (c) = Cosigned By    Initials Name Provider Type    DIAN Dean, PT Physical Therapist    RAQUEL Madden, PTA Physical Therapy Assistant    KANA Heath, PT Student PT Student                  PT Recommendation and Plan  Anticipated Equipment Needs At Discharge:  (none)  Anticipated Discharge Disposition: assisted living, home with 24/7 care  Planned Therapy Interventions: bed mobility training, gait training, home exercise program, balance training, patient/family education, strengthening, stair training, ROM (Range of Motion), stretching  PT Frequency: other (see comments) (5-14x/week)  Plan of Care Review  Outcome Summary/Follow up Plan: pt sup<>sit with CGA-min assist, pt sit<>stand with min assist, pt stepped to HOB with min assist, pt will require 24/7 care & continued PT services @ D/C          Outcome Measures       01/21/18 1300 01/20/18 1100 01/18/18 3584    How much help from another person do you currently need...    Turning from your back to your side  while in flat bed without using bedrails? 3  -TA 3  -TA 3  -MN (r) HL (t) MN (c)    Moving from lying on back to sitting on the side of a flat bed without bedrails? 3  -TA 3  -TA 3  -MN (r) HL (t) MN (c)    Moving to and from a bed to a chair (including a wheelchair)? 3  -TA 3  -TA 3  -MN (r) HL (t) MN (c)    Standing up from a chair using your arms (e.g., wheelchair, bedside chair)? 3  -TA 3  -TA 3  -MN (r) HL (t) MN (c)    Climbing 3-5 steps with a railing? 2  -TA 2  -TA 2  -MN (r) HL (t) MN (c)    To walk in hospital room? 2  -TA 2  -TA 3  -MN (r) HL (t) MN (c)    AM-PAC 6 Clicks Score 16  -TA 16  -TA 17  -MN (r) HL (t)    Functional Assessment    Outcome Measure Options AM-PAC 6 Clicks Basic Mobility (PT)  -TA AM-PAC 6 Clicks Basic Mobility (PT)  -TA AM-PAC 6 Clicks Basic Mobility (PT)  -MN (r) HL (t) MN (c)      User Key  (r) = Recorded By, (t) = Taken By, (c) = Cosigned By    Initials Name Provider Type    DIAN Dean, PT Physical Therapist    RAQUEL Madden PTA Physical Therapy Assistant     Natalie Heath, PT Student PT Student           Time Calculation:         PT Charges       01/21/18 1311          Time Calculation    Start Time 0956  -TA      Stop Time 1035  -TA      Time Calculation (min) 39 min  -TA      Time Calculation- PT    Total Timed Code Minutes- PT 39 minute(s)  -TA        User Key  (r) = Recorded By, (t) = Taken By, (c) = Cosigned By    Initials Name Provider Type    RAQUEL Madden PTA Physical Therapy Assistant          Therapy Charges for Today     Code Description Service Date Service Provider Modifiers Qty    35006283492 HC PT THERAPEUTIC ACT EA 15 MIN 1/20/2018 Kenyatta Madden PTA GP 2    24937410692 HC PT THER PROC EA 15 MIN 1/20/2018 Kenyatta Madden PTA GP 1    19113301691 HC PT THERAPEUTIC ACT EA 15 MIN 1/21/2018 Kenyatta Madden PTA GP 2    21163520879 HC PT THER PROC EA 15 MIN 1/21/2018 Kenyatta Madden PTA GP 1          PT G-Codes  PT Professional Judgement Used?:  Yes  Outcome Measure Options: AM-PAC 6 Clicks Basic Mobility (PT)  Score: 17  Functional Limitation: Mobility: Walking and moving around  Mobility: Walking and Moving Around Current Status (): At least 40 percent but less than 60 percent impaired, limited or restricted  Mobility: Walking and Moving Around Goal Status (): At least 1 percent but less than 20 percent impaired, limited or restricted    Kenyatta Madden, PTA  1/21/2018

## 2018-01-21 NOTE — PLAN OF CARE
Problem: Patient Care Overview (Adult)  Goal: Plan of Care Review  Outcome: Ongoing (interventions implemented as appropriate)   01/21/18 5879   Coping/Psychosocial Response Interventions   Plan Of Care Reviewed With patient   Patient Care Overview   Progress improving     Goal: Adult Individualization and Mutuality  Outcome: Ongoing (interventions implemented as appropriate)    Goal: Discharge Needs Assessment  Outcome: Ongoing (interventions implemented as appropriate)      Problem: Fluid Volume Deficit (Adult)  Goal: Fluid/Electrolyte Balance  Outcome: Ongoing (interventions implemented as appropriate)    Goal: Comfort/Well Being  Outcome: Ongoing (interventions implemented as appropriate)      Problem: Fall Risk (Adult)  Goal: Absence of Falls  Outcome: Ongoing (interventions implemented as appropriate)      Problem: Pressure Ulcer Risk (Anup Scale) (Adult,Obstetrics,Pediatric)  Goal: Skin Integrity  Outcome: Ongoing (interventions implemented as appropriate)

## 2018-01-21 NOTE — PROGRESS NOTES
[facility] Medicine Services  INPATIENT PROGRESS NOTE     LOS: 0 days   Patient Care Team:  Katherine Mays MD as PCP - General    Chief Complaint:  Generalized weakness      Subjective     Interval History:    Patient is seen for follow-up today.  She is currently sleeping and no new changes or complaints have been reported.           Patient Complaints: As above.    History taken from: Caregiver    Review of Systems:    Review of Systems   Constitutional: Positive for fatigue. Negative for activity change, appetite change, chills, diaphoresis and fever.   HENT: Negative for trouble swallowing and voice change.    Eyes: Negative for photophobia and visual disturbance.   Respiratory: Negative for cough, choking, chest tightness, shortness of breath, wheezing and stridor.    Cardiovascular: Negative for chest pain, palpitations and leg swelling.   Gastrointestinal: Negative for abdominal distention, abdominal pain, blood in stool, constipation, diarrhea, nausea and vomiting.   Endocrine: Negative for cold intolerance, heat intolerance, polydipsia, polyphagia and polyuria.   Genitourinary: Negative for decreased urine volume, difficulty urinating, dysuria, enuresis, flank pain, frequency, hematuria and urgency.   Musculoskeletal: Negative for arthralgias, gait problem, myalgias, neck pain and neck stiffness.   Skin: Negative for pallor, rash and wound.   Neurological: Negative for dizziness, tremors, seizures, syncope, facial asymmetry, speech difficulty, weakness, light-headedness, numbness and headaches.   Hematological: Does not bruise/bleed easily.   Psychiatric/Behavioral: Negative for agitation, behavioral problems and confusion.         Objective     Vital Signs  Temp:  [98.1 °F (36.7 °C)-100.4 °F (38 °C)] 98.1 °F (36.7 °C)  Heart Rate:  [70-78] 70  Resp:  [16-18] 16  BP: (118-143)/(54-64) 128/63    Physical Exam:   Physical Exam   Constitutional: She is oriented  to person, place, and time. She appears well-developed and well-nourished. She is sleeping. She is easily aroused. No distress.   HENT:   Head: Normocephalic and atraumatic.   Right Ear: External ear normal.   Left Ear: External ear normal.   Nose: Nose normal.   Mouth/Throat: Oropharynx is clear and moist.   Eyes: Conjunctivae and EOM are normal. Pupils are equal, round, and reactive to light.   Neck: Normal range of motion. Neck supple. No JVD present. No thyromegaly present.   Cardiovascular: Normal rate, regular rhythm, normal heart sounds and intact distal pulses.  Exam reveals no gallop and no friction rub.    No murmur heard.  Pulmonary/Chest: Effort normal and breath sounds normal. No stridor. No respiratory distress. She has no wheezes. She has no rales. She exhibits no tenderness.   Abdominal: Soft. Bowel sounds are normal. She exhibits no distension and no mass. There is no tenderness. There is no rebound and no guarding. No hernia.   Musculoskeletal: Normal range of motion. She exhibits no edema, tenderness or deformity.   Neurological: She is oriented to person, place, and time and easily aroused. She has normal reflexes. She exhibits normal muscle tone.   Skin: Skin is warm and dry. No rash noted. She is not diaphoretic. No erythema. No pallor.   Psychiatric: She has a normal mood and affect. Her behavior is normal. Judgment and thought content normal.   Nursing note and vitals reviewed.         Results Review:         Results from last 7 days  Lab Units 01/21/18  0535 01/20/18  0610 01/19/18  0618 01/18/18  0846 01/17/18  1733   SODIUM mmol/L 139 137 137 137 138   POTASSIUM mmol/L 3.9 4.0 4.6 4.3 4.4   CHLORIDE mmol/L 111* 113* 106 105 101   CO2 mmol/L 20.0* 18.0* 23.0 24.0 25.0   BUN mg/dL 27* 24* 31* 34* 42*   CREATININE mg/dL 1.15* 1.09* 1.33* 1.41* 1.58*   GLUCOSE mg/dL 85 75 118* 129* 134*   CALCIUM mg/dL 8.0* 7.8* 8.6 8.8 9.2   BILIRUBIN mg/dL  --   --   --   --  0.9   ALK PHOS U/L  --   --   --    --  81   ALT (SGPT) U/L  --   --   --   --  19   AST (SGOT) U/L  --   --   --   --  30               Results from last 7 days  Lab Units 01/17/18  1733   WBC 10*3/mm3 9.05   HEMOGLOBIN g/dL 11.1*   HEMATOCRIT % 33.1*   PLATELETS 10*3/mm3 168       No results found for: CKTOTAL, CKMB, CKMBINDEX, TROPONINI, TROPONINT    CO2   Date Value Ref Range Status   01/21/2018 20.0 (L) 22.0 - 31.0 mmol/L Final         Results from last 7 days  Lab Units 01/17/18  1733   INR  1.12        Imaging Results (last 7 days)     Procedure Component Value Units Date/Time    XR Shoulder 2+ View Left [[id number]] Collected:  01/17/18 1736     Updated:  01/17/18 1810    Narrative:         Three view left shoulder    HISTORY: Pain after falling    AP films with the humerus in internal and external rotation and  scapular Y view were obtained.    COMPARISON: None    No fracture or dislocation.  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    Degenerative changes in the thoracic spine.  Old granulomatous disease in the chest.      Impression:       CONCLUSION:  No fracture or dislocation.  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    [id number]    Electronically signed by:  Kevin Hernandez MD  1/17/2018 6:09 PM CST  Workstation: [id number]    XR Humerus Left [[id number]] Collected:  01/17/18 1736     Updated:  01/17/18 1812    Narrative:         Two view left humerus    HISTORY: Pain after falling    AP and lateral views obtained.    COMPARISON: None    No fracture or dislocation.  Advanced degenerative change glenohumeral joint with probable  secondary osteonecrosis of the humeral head and possible synovial  osteochondromatosis.      Impression:       CONCLUSION:  No fracture or dislocation    [id number]    Electronically signed by:  Kevin  David ROBLERO  1/17/2018 6:11 PM CST  Workstation: [id number]    XR Pelvis 1 or 2 View [[id number]] Collected:  01/17/18 1736     Updated:  01/17/18 1820    Narrative:       PROCEDURE: XR PELVIS 1 OR 2 VW    VIEWS: 1    INDICATION: Pain    COMPARISON: None    FINDINGS:     - fracture: None    - alignment: Within normal limits    - misc: Age-related degenerative changes. Diffusely decreased  osseous mineralization. Degenerative changes are present in lower  lumbar spine. Irregular calcifications in the pelvis probably  represents a fibroid.          Impression:       No acute osseous abnormality identified..      Note:  if pain or symptoms persist beyond reasonable expectations    and follow-up imaging is anticipated,  cross sectional imaging   (CT and/or MRI) is suggested, as is deemed clinically   appropriate.    Electronically signed by:  Katherine Ashford MD  1/17/2018 6:19 PM CST  Workstation: [id number]    XR Chest 1 View [[id number]] Collected:  01/17/18 1736     Updated:  01/17/18 1822    Narrative:       PROCEDURE: XR CHEST 1 VW    VIEWS:Single    INDICATION: Dyspnea    COMPARISON: Acute abdominal series: 1/24/2017    FINDINGS:       - lines/tubes: None    - cardiac: Borderline size    - mediastinum: Atherosclerotic calcification and aortic  tortuosity.     - lungs: No evidence of a focal air space process, pulmonary  interstitial edema, nodule(s)/mass.     - pleura: No evidence of  fluid.      - osseous: Degenerative changes of the bilateral shoulders are  stable.      Impression:       No acute cardiopulmonary process identified.      Electronically signed by:  Katherine Ashford MD  1/17/2018 6:21 PM CST  Workstation: [id number]    CT Head Without Contrast [[id number]] Collected:  01/17/18 1801     Updated:  01/17/18 1841    Narrative:       EXAM DESCRIPTION: CT HEAD WO CONTRAST    CLINICAL HISTORY:  head injury       COMPARISON: No studies available for comparison. Report of exam  5/22/2007 is reviewed.    DOSE LENGTH  PRODUCT: 971.10    CONTRAST: None    TECHNIQUE:    Axial images from skull base to vertex.    This exam was performed according to our departmental dose  optimization program, which includes automated exposure control,  adjustment of the mA and/or KV according to patient size and/or  use of iterative reconstruction technique.    FINDINGS:  No Chiari malformation.  Extra-axial spaces: Appropriate for patient age and degree of  volume loss.    Intracranial hemorrhage: No evidence of intracranial hemorrhage  or suspicious extra-axial fluid collections.  Ventricular system: No hydrocephalus.   Basal cisterns: Patent.   Cerebral parenchyma: No edema, midline shift, or mass effect.  Gray-white differentiation is maintained. There is mild-moderate  volume loss with associated change. There is sequela chronic  microvascular disease.. She reported symmetric benign  calcification is again seen within the basal ganglia and the  cerebellar hemispheres.    Midline shift: None.    Cerebellum: No acute or suspicious finding.   Brainstem : Unremarkable CT appearance.   Calvarium: No evidence of calvarial fracture.    Vascular system: Vascular calcification, otherwise unremarkable  noncontrast appearance.    Paranasal sinuses and mastoid air cells: No air-fluid levels or  significant mucosal thickening within the included sinuses. The  mastoids and middle ear cavities are clear.    Visualized orbits: No acute findings.    Visualized upper cervical spine: Not included.   Sella: Unremarkable.    Skull base: Unremarkable.     ADDITIONAL FINDINGS: There is a left posterior parietal scalp  contusion/hematoma.      Impression:       Left parietal scalp injury without underlying calvarial fracture,  intracranial hemorrhage, mass effect, or acute large vessel  distribution infarct.    Mild-moderate volume loss and sequela chronic microvascular  disease. Benign cerebral and cerebellar mineralization. The  latter finding was reported on the  prior exam from 2017.    Electronically signed by:  Ryder Marcano MD  1/17/2018 6:40 PM  CST Workstation: [id number]    CT Cervical Spine Without Contrast [[id number]] Collected:  01/17/18 1801     Updated:  01/17/18 1849    Narrative:       EXAM DESCRIPTION: CT CERVICAL SPINE WO CONTRAST    CLINICAL HISTORY: head injury    COMPARISON: None      TECHNIQUE: Multiple contiguous noncontrast axial images are  obtained of the cervical spine. Coronal and sagittal multiplanar  reformatted images are also reviewed.   This exam was performed according to our departmental dose  optimization program, which includes automated exposure control,  adjustment of the mA and/or KV according to patient size and/or  use of iterative reconstruction technique.    DLP: 971.10     FINDINGS:   No evidence of prevertebral thickening or suspicious fluid  collection. The included lung apices are unremarkable for acute  finding. The included skull base, mastoids and paranasal sinuses  are unremarkable. There is incidental note made of large torus  palatini.    There is generalized demineralization.    There is approximate 2 mm degenerative anterolisthesis of C3 upon  C4 and retrolisthesis of C5 upon C6. Otherwise there is  straightening and reversal of normal curvature with anatomic  alignment.  No evidence of compression fracture deformity. The craniocervical  articulations are intact. There is arthritic changes at C1-C2  however no widening of the atlantodental interval. The lateral  masses are appropriately positioned. No fracture of the dens  identified. Normal alignment of the posterior facets. No fracture  or perched facet. Arthritic changes are present greater on the  left.    Multilevel disc space narrowing, greatest at C4-5 and C5-6.    C4-5: There is mild disc osteophyte complex with left greater  than right uncovertebral posterior facet hypertrophy. There is  severe left and moderate right foraminal stenosis.    C5-6: There is disc  osteophyte complex contributing to mild  central spinal canal stenosis. Uncovertebral and posterior facet  hypertrophy contribute to bilateral severe foraminal stenosis.    C6-7: Uncovertebral and posterior facet hypertrophy on the left  contributing to moderate foraminal stenosis.      Impression:       No acute fracture or subluxation of the cervical spine.    Demineralization and multilevel degenerative changes of the  cervical spine as detailed above.    Electronically signed by:  Ryder Marcano MD  1/17/2018 6:47 PM  [facility] Workstation: [id number]                                    Medication Review:   Current Facility-Administered Medications   Medication Dose Route Frequency Provider Last Rate Last Dose   • cefTRIAXone (ROCEPHIN) in NS 1 gram/10ml IV PUSH syringe  1 g Intravenous Q24H Lyle Lane MD   1 g at 01/20/18 0932   • latanoprost (XALATAN) 0.005 % ophthalmic solution 1 drop  1 drop Both Eyes Nightly Marvel Boyd MD   1 drop at 01/20/18 2050   • levobunolol (BETAGAN) 0.5 % ophthalmic solution 1 drop  1 drop Both Eyes BID Marvel Boyd MD   1 drop at 01/21/18 1007   • metoprolol succinate XL (TOPROL-XL) 24 hr tablet 25 mg  25 mg Oral Daily Marvel Boyd MD   25 mg at 01/21/18 0905   • polyethylene glycol (MIRALAX) powder 17 g  17 g Oral Daily PRN Marvel Boyd MD       • sodium chloride 0.9 % flush 1-10 mL  1-10 mL Intravenous PRN Marvel Boyd MD             Assessment/Plan     Principal Problem:    Generalized weakness  Active Problems:    Scalp laceration    Hypertension    Frequent falls    Dehydration  1.  Acute kidney injury: Resolved.    2.  UTI: Begin IV antibiotics. Urine culture isolated mixed matheus.    3.  Deconditioning: Continue PT and OT.  4.  Continue GI and DVT prophylaxis.  5.  Discharge planning is ongoing and the plan is to have patient placed at a nursing home.            Lyle Lane MD  01/21/18      EMR Dragon/Transcription disclaimer:   Much of this encounter note is  an electronic transcription/translation of spoken language to printed text. The electronic translation of spoken language may permit erroneous, or at times, nonsensical words or phrases to be inadvertently transcribed; Although I have reviewed the note for such errors, some may still exist.

## 2018-01-22 PROCEDURE — 25010000002 CEFTRIAXONE PER 250 MG: Performed by: INTERNAL MEDICINE

## 2018-01-22 PROCEDURE — G0378 HOSPITAL OBSERVATION PER HR: HCPCS

## 2018-01-22 PROCEDURE — 97530 THERAPEUTIC ACTIVITIES: CPT

## 2018-01-22 PROCEDURE — 97110 THERAPEUTIC EXERCISES: CPT

## 2018-01-22 PROCEDURE — 96376 TX/PRO/DX INJ SAME DRUG ADON: CPT

## 2018-01-22 RX ADMIN — LEVOBUNOLOL HYDROCHLORIDE 1 DROP: 5 SOLUTION/ DROPS OPHTHALMIC at 10:43

## 2018-01-22 RX ADMIN — LEVOBUNOLOL HYDROCHLORIDE 1 DROP: 5 SOLUTION/ DROPS OPHTHALMIC at 20:55

## 2018-01-22 RX ADMIN — SODIUM CHLORIDE 1 G: 9 INJECTION INTRAMUSCULAR; INTRAVENOUS; SUBCUTANEOUS at 10:42

## 2018-01-22 RX ADMIN — SODIUM CHLORIDE 50 ML/HR: 900 INJECTION, SOLUTION INTRAVENOUS at 10:49

## 2018-01-22 RX ADMIN — METOPROLOL SUCCINATE 25 MG: 25 TABLET, EXTENDED RELEASE ORAL at 10:42

## 2018-01-22 RX ADMIN — LATANOPROST 1 DROP: 50 SOLUTION OPHTHALMIC at 20:55

## 2018-01-22 NOTE — PROGRESS NOTES
[facility] Medicine Services  INPATIENT PROGRESS NOTE     LOS: 0 days   Patient Care Team:  Katherine Mays MD as PCP - General    Chief Complaint:  Generalized weakness      Subjective     Interval History:    Patient is seen for follow-up today.  She is doing well and reported to be periodically confused.  She continues to tolerate prescribed diet.           Patient Complaints: As above.    History taken from: Patient and nursing staff    Review of Systems:    Review of Systems   Constitutional: Positive for fatigue. Negative for activity change, appetite change, chills, diaphoresis and fever.   HENT: Negative for trouble swallowing and voice change.    Eyes: Negative for photophobia and visual disturbance.   Respiratory: Negative for cough, choking, chest tightness, shortness of breath, wheezing and stridor.    Cardiovascular: Negative for chest pain, palpitations and leg swelling.   Gastrointestinal: Negative for abdominal distention, abdominal pain, blood in stool, constipation, diarrhea, nausea and vomiting.   Endocrine: Negative for cold intolerance, heat intolerance, polydipsia, polyphagia and polyuria.   Genitourinary: Negative for decreased urine volume, difficulty urinating, dysuria, enuresis, flank pain, frequency, hematuria and urgency.   Musculoskeletal: Negative for arthralgias, gait problem, myalgias, neck pain and neck stiffness.   Skin: Negative for pallor, rash and wound.   Neurological: Negative for dizziness, tremors, seizures, syncope, facial asymmetry, speech difficulty, weakness, light-headedness, numbness and headaches.   Hematological: Does not bruise/bleed easily.   Psychiatric/Behavioral: Negative for agitation, behavioral problems and confusion.         Objective     Vital Signs  Temp:  [97.6 °F (36.4 °C)-99.5 °F (37.5 °C)] 97.6 °F (36.4 °C)  Heart Rate:  [63-80] 71  Resp:  [16-18] 18  BP: (135-140)/(62-78) 137/62    Physical  Exam:   Physical Exam   Constitutional: She is oriented to person, place, and time. She appears well-developed and well-nourished. She is sleeping. She is easily aroused. No distress.   HENT:   Head: Normocephalic and atraumatic.   Right Ear: External ear normal.   Left Ear: External ear normal.   Nose: Nose normal.   Mouth/Throat: Oropharynx is clear and moist.   Eyes: Conjunctivae and EOM are normal. Pupils are equal, round, and reactive to light.   Neck: Normal range of motion. Neck supple. No JVD present. No thyromegaly present.   Cardiovascular: Normal rate, regular rhythm, normal heart sounds and intact distal pulses.  Exam reveals no gallop and no friction rub.    No murmur heard.  Pulmonary/Chest: Effort normal and breath sounds normal. No stridor. No respiratory distress. She has no wheezes. She has no rales. She exhibits no tenderness.   Abdominal: Soft. Bowel sounds are normal. She exhibits no distension and no mass. There is no tenderness. There is no rebound and no guarding. No hernia.   Musculoskeletal: Normal range of motion. She exhibits no edema, tenderness or deformity.   Neurological: She is oriented to person, place, and time and easily aroused. She has normal reflexes. She exhibits normal muscle tone.   Skin: Skin is warm and dry. No rash noted. She is not diaphoretic. No erythema. No pallor.   Psychiatric: She has a normal mood and affect. Her behavior is normal. Judgment and thought content normal.   Nursing note and vitals reviewed.         Results Review:         Results from last 7 days  Lab Units 01/21/18  0535 01/20/18  0610 01/19/18  0618 01/18/18  0846 01/17/18  1733   SODIUM mmol/L 139 137 137 137 138   POTASSIUM mmol/L 3.9 4.0 4.6 4.3 4.4   CHLORIDE mmol/L 111* 113* 106 105 101   CO2 mmol/L 20.0* 18.0* 23.0 24.0 25.0   BUN mg/dL 27* 24* 31* 34* 42*   CREATININE mg/dL 1.15* 1.09* 1.33* 1.41* 1.58*   GLUCOSE mg/dL 85 75 118* 129* 134*   CALCIUM mg/dL 8.0* 7.8* 8.6 8.8 9.2   BILIRUBIN  mg/dL  --   --   --   --  0.9   ALK PHOS U/L  --   --   --   --  81   ALT (SGPT) U/L  --   --   --   --  19   AST (SGOT) U/L  --   --   --   --  30               Results from last 7 days  Lab Units 01/17/18  1733   WBC 10*3/mm3 9.05   HEMOGLOBIN g/dL 11.1*   HEMATOCRIT % 33.1*   PLATELETS 10*3/mm3 168       No results found for: CKTOTAL, CKMB, CKMBINDEX, TROPONINI, TROPONINT    CO2   Date Value Ref Range Status   01/21/2018 20.0 (L) 22.0 - 31.0 mmol/L Final         Results from last 7 days  Lab Units 01/17/18  1733   INR  1.12        Imaging Results (last 7 days)     Procedure Component Value Units Date/Time    XR Shoulder 2+ View Left [[id number]] Collected:  01/17/18 1736     Updated:  01/17/18 1810    Narrative:         Three view left shoulder    HISTORY: Pain after falling    AP films with the humerus in internal and external rotation and  scapular Y view were obtained.    COMPARISON: None    No fracture or dislocation.  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    Degenerative changes in the thoracic spine.  Old granulomatous disease in the chest.      Impression:       CONCLUSION:  No fracture or dislocation.  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    [id number]    Electronically signed by:  Kevin Hernandez MD  1/17/2018 6:09 PM CST  Workstation: [email or web address]    XR Humerus Left [[id number]] Collected:  01/17/18 1736     Updated:  01/17/18 1812    Narrative:         Two view left humerus    HISTORY: Pain after falling    AP and lateral views obtained.    COMPARISON: None    No fracture or dislocation.  Advanced degenerative change glenohumeral joint with probable  secondary osteonecrosis of the humeral head and possible synovial  osteochondromatosis.      Impression:       CONCLUSION:  No fracture or  dislocation    [id number]    Electronically signed by:  Kevin Hernandez MD  1/17/2018 6:11 PM CST  Workstation: [id number]    XR Pelvis 1 or 2 View [[id number]] Collected:  01/17/18 1736     Updated:  01/17/18 1820    Narrative:       PROCEDURE: XR PELVIS 1 OR 2 VW    VIEWS: 1    INDICATION: Pain    COMPARISON: None    FINDINGS:     - fracture: None    - alignment: Within normal limits    - misc: Age-related degenerative changes. Diffusely decreased  osseous mineralization. Degenerative changes are present in lower  lumbar spine. Irregular calcifications in the pelvis probably  represents a fibroid.          Impression:       No acute osseous abnormality identified..      Note:  if pain or symptoms persist beyond reasonable expectations    and follow-up imaging is anticipated,  cross sectional imaging   (CT and/or MRI) is suggested, as is deemed clinically   appropriate.    Electronically signed by:  Katherine Ashford MD  1/17/2018 6:19 PM CST  Workstation: [id number]    XR Chest 1 View [[id number]] Collected:  01/17/18 1736     Updated:  01/17/18 1822    Narrative:       PROCEDURE: XR CHEST 1 VW    VIEWS:Single    INDICATION: Dyspnea    COMPARISON: Acute abdominal series: 1/24/2017    FINDINGS:       - lines/tubes: None    - cardiac: Borderline size    - mediastinum: Atherosclerotic calcification and aortic  tortuosity.     - lungs: No evidence of a focal air space process, pulmonary  interstitial edema, nodule(s)/mass.     - pleura: No evidence of  fluid.      - osseous: Degenerative changes of the bilateral shoulders are  stable.      Impression:       No acute cardiopulmonary process identified.      Electronically signed by:  Katherine Ashford MD  1/17/2018 6:21 PM CST  Workstation: [id number]    CT Head Without Contrast [[id number]] Collected:  01/17/18 1801     Updated:  01/17/18 1841    Narrative:       EXAM DESCRIPTION: CT HEAD WO CONTRAST    CLINICAL HISTORY:  head injury       COMPARISON: No studies available for comparison.  Report of exam  5/22/2007 is reviewed.    DOSE LENGTH PRODUCT: 971.10    CONTRAST: None    TECHNIQUE:    Axial images from skull base to vertex.    This exam was performed according to our departmental dose  optimization program, which includes automated exposure control,  adjustment of the mA and/or KV according to patient size and/or  use of iterative reconstruction technique.    FINDINGS:  No Chiari malformation.  Extra-axial spaces: Appropriate for patient age and degree of  volume loss.    Intracranial hemorrhage: No evidence of intracranial hemorrhage  or suspicious extra-axial fluid collections.  Ventricular system: No hydrocephalus.   Basal cisterns: Patent.   Cerebral parenchyma: No edema, midline shift, or mass effect.  Gray-white differentiation is maintained. There is mild-moderate  volume loss with associated change. There is sequela chronic  microvascular disease.. She reported symmetric benign  calcification is again seen within the basal ganglia and the  cerebellar hemispheres.    Midline shift: None.    Cerebellum: No acute or suspicious finding.   Brainstem : Unremarkable CT appearance.   Calvarium: No evidence of calvarial fracture.    Vascular system: Vascular calcification, otherwise unremarkable  noncontrast appearance.    Paranasal sinuses and mastoid air cells: No air-fluid levels or  significant mucosal thickening within the included sinuses. The  mastoids and middle ear cavities are clear.    Visualized orbits: No acute findings.    Visualized upper cervical spine: Not included.   Sella: Unremarkable.    Skull base: Unremarkable.     ADDITIONAL FINDINGS: There is a left posterior parietal scalp  contusion/hematoma.      Impression:       Left parietal scalp injury without underlying calvarial fracture,  intracranial hemorrhage, mass effect, or acute large vessel  distribution infarct.    Mild-moderate volume loss and sequela chronic microvascular  disease. Benign cerebral and cerebellar  mineralization. The  latter finding was reported on the prior exam from 2017.    Electronically signed by:  Ryder Marcano MD  1/17/2018 6:40 PM  CST Workstation: [id number]    CT Cervical Spine Without Contrast [[id number]] Collected:  01/17/18 1801     Updated:  01/17/18 1849    Narrative:       EXAM DESCRIPTION: CT CERVICAL SPINE WO CONTRAST    CLINICAL HISTORY: head injury    COMPARISON: None      TECHNIQUE: Multiple contiguous noncontrast axial images are  obtained of the cervical spine. Coronal and sagittal multiplanar  reformatted images are also reviewed.   This exam was performed according to our departmental dose  optimization program, which includes automated exposure control,  adjustment of the mA and/or KV according to patient size and/or  use of iterative reconstruction technique.    DLP: 971.10     FINDINGS:   No evidence of prevertebral thickening or suspicious fluid  collection. The included lung apices are unremarkable for acute  finding. The included skull base, mastoids and paranasal sinuses  are unremarkable. There is incidental note made of large torus  palatini.    There is generalized demineralization.    There is approximate 2 mm degenerative anterolisthesis of C3 upon  C4 and retrolisthesis of C5 upon C6. Otherwise there is  straightening and reversal of normal curvature with anatomic  alignment.  No evidence of compression fracture deformity. The craniocervical  articulations are intact. There is arthritic changes at C1-C2  however no widening of the atlantodental interval. The lateral  masses are appropriately positioned. No fracture of the dens  identified. Normal alignment of the posterior facets. No fracture  or perched facet. Arthritic changes are present greater on the  left.    Multilevel disc space narrowing, greatest at C4-5 and C5-6.    C4-5: There is mild disc osteophyte complex with left greater  than right uncovertebral posterior facet hypertrophy. There is  severe left and  moderate right foraminal stenosis.    C5-6: There is disc osteophyte complex contributing to mild  central spinal canal stenosis. Uncovertebral and posterior facet  hypertrophy contribute to bilateral severe foraminal stenosis.    C6-7: Uncovertebral and posterior facet hypertrophy on the left  contributing to moderate foraminal stenosis.      Impression:       No acute fracture or subluxation of the cervical spine.    Demineralization and multilevel degenerative changes of the  cervical spine as detailed above.    Electronically signed by:  Ryder Marcano MD  1/17/2018 6:47 PM  [facility] Workstation: [id number]                                    Medication Review:   Current Facility-Administered Medications   Medication Dose Route Frequency Provider Last Rate Last Dose   • cefTRIAXone (ROCEPHIN) in NS 1 gram/10ml IV PUSH syringe  1 g Intravenous Q24H Lyle Lane MD   1 g at 01/22/18 1042   • latanoprost (XALATAN) 0.005 % ophthalmic solution 1 drop  1 drop Both Eyes Nightly Marvel Boyd MD   1 drop at 01/21/18 2100   • levobunolol (BETAGAN) 0.5 % ophthalmic solution 1 drop  1 drop Both Eyes BID Marvel Boyd MD   1 drop at 01/22/18 1043   • metoprolol succinate XL (TOPROL-XL) 24 hr tablet 25 mg  25 mg Oral Daily Marvel Boyd MD   25 mg at 01/22/18 1042   • polyethylene glycol (MIRALAX) powder 17 g  17 g Oral Daily PRN Marvel Boyd MD       • sodium chloride 0.9 % flush 1-10 mL  1-10 mL Intravenous PRN Marvel Boyd MD       • sodium chloride 0.9 % infusion  50 mL/hr Intravenous Continuous Lyle Lane MD 50 mL/hr at 01/22/18 1049 50 mL/hr at 01/22/18 1049         Assessment/Plan     Principal Problem:    Generalized weakness  Active Problems:    Scalp laceration    Hypertension    Frequent falls    Dehydration  1.  Acute kidney injury: Resolved.    2.  UTI: Continue IV antibiotics. Urine culture isolated mixed matheus.    3.  Deconditioning: Continue PT and OT.  4.  Continue GI and DVT prophylaxis.  5.   Discharge planning is ongoing and the plan is to have patient placed at a nursing home.            Lyle Lane MD  01/22/18      EMR Dragon/Transcription disclaimer:   Much of this encounter note is an electronic transcription/translation of spoken language to printed text. The electronic translation of spoken language may permit erroneous, or at times, nonsensical words or phrases to be inadvertently transcribed; Although I have reviewed the note for such errors, some may still exist.

## 2018-01-22 NOTE — PLAN OF CARE
Problem: Patient Care Overview (Adult)  Goal: Plan of Care Review  Outcome: Ongoing (interventions implemented as appropriate)   01/22/18 1357   Outcome Evaluation   Outcome Summary/Follow up Plan Pt demonstrated bed mobility at [facility] with verbal cues needed for UE placement. Pt t/f min A supine-sit and sat EOB ~30 minutes while performing AROM LE ex. Pt presented with posterior lean in sitting EOB. Pt may benefit from rehab at home upon D/C.        Problem: Inpatient Physical Therapy  Goal: Transfer Training Goal 1 LTG- PT  Outcome: Ongoing (interventions implemented as appropriate)   01/18/18 1512 01/22/18 1357   Transfer Training PT LTG   Transfer Training PT LTG, Date Established 01/18/18 --    Transfer Training PT LTG, Time to Achieve by discharge --    Transfer Training PT LTG, Carter Level minimum assist (75% patient effort) --    Transfer Training PT LTG, Assist Device walker, rolling --    Transfer Training PT LTG, Date Goal Reviewed --  01/22/18   Transfer Training PT LTG, Outcome --  goal ongoing     Goal: Gait Training Goal LTG- PT  Outcome: Ongoing (interventions implemented as appropriate)   01/18/18 1512 01/22/18 1357   Gait Training PT LTG   Gait Training Goal PT LTG, Date Established 01/18/18 --    Gait Training Goal PT LTG, Time to Achieve by discharge --    Gait Training Goal PT LTG, Carter Level minimum assist (75% patient effort) --    Gait Training Goal PT LTG, Assist Device walker, rolling --    Gait Training Goal PT LTG, Distance to Achieve 50 feet --    Gait Training Goal PT LTG, Date Goal Reviewed --  01/22/18   Gait Training Goal PT LTG, Outcome --  goal ongoing

## 2018-01-22 NOTE — THERAPY TREATMENT NOTE
Acute Care - Physical Therapy Treatment Note  [facility]     Patient Name: Radha Frias [facility]  : 2/3/1914  MRN: [id number]  Today's Date: 2018  Onset of Illness/Injury or Date of Surgery Date: 18  Date of Referral to PT: 18  Referring Physician: Dr. Lane    Admit Date: 2018    Visit Dx:    ICD-10-CM ICD-9-CM   1. Laceration of scalp, initial encounter S01.01XA 873.0   2. Osteoarthritis of left shoulder, unspecified osteoarthritis type M19.012 715.91   3. Chronic kidney disease, unspecified CKD stage N18.9 585.9   4. Dehydration E86.0 276.51   5. Left bundle branch block I44.7 426.3   6. Impaired functional mobility, balance, gait, and endurance Z74.09 V49.89     Patient Active Problem List   Diagnosis   • Leg pain, right   • Pseudophakia   • Borderline glaucoma   • Primary open angle glaucoma of both eyes, mild stage   • Scalp laceration   • Hypertension   • Frequent falls   • Generalized weakness   • Dehydration               Adult Rehabilitation Note       18 1357 18 0956 18 0919    Rehab Assessment/Intervention    Discipline  physical therapy assistant  -TA physical therapy assistant  -TA    Document Type therapy note (daily note)  -SIERRA DUKES KC2 therapy note (daily note)  -TA therapy note (daily note)  -TA    Subjective Information agree to therapy  -SIERRA DUKES KC2 agree to therapy  -TA agree to therapy;complains of;dizziness  -TA    Patient Effort, Rehab Treatment good  -SIERRA DUKESKCKeyanna good  -TA good  -TA    Symptoms Noted During/After Treatment none  -SIERRA DUKES,KC2      Precautions/Limitations fall precautions  -SIERRA DUKES KC2 fall precautions  -TA fall precautions  -TA    Patient Response to Treatment   pt stated, she stays dizzy when she is up  -TA    Recorded by [SIERRA DUKES,KC2] Susie Jacobo PTA (r) Bibiana Ram PT Student (t) Susie Jacobo PTA (c) [TA] Kenyatta Madden PTA [TA] Kenyatta Madden PTA    Vital Signs    Pre Systolic BP Rehab 139  -SIERRA DUKESKC2 149  -  -TA     Pre Treatment Diastolic BP 65  -ERNST,BM,KC2 97  -TA 50  -TA    Post Systolic BP Rehab  161  -  -TA    Post Treatment Diastolic BP  70  -TA 54  -TA    Pretreatment Heart Rate (beats/min) 78  -ERNST,BM,KC2 72  -TA 61  -TA    Intratreatment Heart Rate (beats/min)  70  -TA     Posttreatment Heart Rate (beats/min)  75  -TA 72  -TA    Pre SpO2 (%)  98  -TA 99  -TA    O2 Delivery Pre Treatment  room air  -TA supplemental O2  -TA    Intra SpO2 (%)  97  -TA     Post SpO2 (%)  99  -TA 99  -TA    Pre Patient Position Supine  -ERNST,BM,KC2 Supine  -TA Supine  -TA    Intra Patient Position Sitting  -ERNST,BM,KC2      Post Patient Position Supine  -ERNST,BM,KC2 Supine  -TA Supine  -TA    Recorded by [ERNST,BM,KC2] Susie Jacobo, PTA (r) Bibiana Ram, PT Student (t) Susie Jacobo PTA (c) [TA] Kenyatta M Almon, PTA [TA] Kenyatta M Almon, PTA    Pain Assessment    Pain Assessment No/denies pain  -ERNST,BM,KC2 No/denies pain  -TA No/denies pain  -TA    Recorded by [ERNST,BM,KC2] Susie Jacobo PTA (r) Bibiana Ram, PT Student (t) Susie Jacobo PTA (c) [TA] Kenyatta M Almon, PTA [TA] Kenyatta M Almon, PTA    Vision Assessment/Intervention    Visual Impairment WFL with corrective lenses  -ERNST,BM,KC2      Recorded by [ERNST,BM,KC2] Susie Jacobo PTA (r) Bibiana Ram, PT Student (t) Susie Jacobo PTA (c)      Cognitive Assessment/Intervention    Current Cognitive/Communication Assessment impaired  -ERNST,BM,KC2 impaired  -TA impaired  -TA    Orientation Status oriented to;person;disoriented to;situation  -ERNST,BM,KC2 oriented to;person;situation  -TA oriented to;person;situation  -TA    Follows Commands/Answers Questions 75% of the time  -ERNST,BM,KC2 75% of the time  -TA 50% of the time  -TA    Personal Safety decreased awareness, need for assist;decreased awareness, need for safety  -ERNST,BM,KC2 decreased awareness, need for safety  -TA unable/difficult to assess  -TA    Recorded by [ERNST,BM,KC2] Susie Jacobo, PTA (r) Bibiana Ram, PT Student (t) Susie CALIX  MARGARITO Jacobo (c) [TA] Kenyatta Madden PTA [TA] Kenyatta Madden, MARGARITO    Bed Mobility, Assessment/Treatment    Bed Mobility, Assistive Device bed rails;head of bed elevated  -ERNST,SIERRA,KC2 bed rails;head of bed elevated  -TA bed rails;head of bed elevated  -TA    Bed Mobility, Roll Left, [place] verbal cues required;contact guard assist  -ERNST,SIERRA,KC2 supervision required  -TA contact guard assist  -TA    Bed Mobility, Roll Right, [place] verbal cues required;contact guard assist  -ERNST,SIERRA,KC2 supervision required  -TA contact guard assist  -TA    Bed Mobility, Scoot/Bridge, [place] verbal cues required;contact guard assist  -ERNST,SIERRA,KC2 supervision required  -TA contact guard assist  -TA    Bed Mob, Supine to Sit, [place] minimum assist (75% patient effort)  -SIERRA DUKES,KC2 contact guard assist  -TA minimum assist (75% patient effort)  -TA    Bed Mob, Sit to Supine, [place] minimum assist (75% patient effort)  -SIERRA DUKES,KC2 minimum assist (75% patient effort)  -TA minimum assist (75% patient effort)  -TA    Bed Mobility, Comment (P)  Pt sat EOB ~30 minutes. Pt presented with posterior lean.   Simultaneous filing. User may not have seen previous data.  -SIERRA DUKES,KC2 pt sat @ EOB 15m minutes with SBA  -TA pt sat @ EOB 15 minutes with CGA-SBA of 1  -TA    Recorded by [SIERRA DUKES,KC2] Susie Jacobo PTA (r) Bibiana Ram PT Student (t) Susie Jacobo PTA (c) [TA] Kenyatta Madden, MARGARITO [TA] Kenyatta Madden PTA    Transfer Assessment/Treatment    Transfers, Sit-Stand [place]  minimum assist (75% patient effort)  -TA minimum assist (75% patient effort)  -TA    Transfers, Stand-Sit [place]  minimum assist (75% patient effort)  -TA minimum assist (75% patient effort)  -TA    Transfers, Sit-Stand-Sit, Assist Device  other (see comments)   HHA  -TA other (see comments)   HHA  -TA    Recorded by  [TA] Kenyatta Madden PTA [TA] Kenyatta Madden PTA    Gait Assessment/Treatment    Gait, [place] Level  minimum assist (75%  patient effort)  -TA moderate assist (50% patient effort)  -TA    Gait, Assistive Device  other (see comments)   HHA  -TA other (see comments)   HHA  -TA    Gait, Distance (Feet)  5  -TA 5   >HOB  -TA    Recorded by  [TA] Kenyatta Madden PTA [TA] Kenyatta Madden PTA    Therapy Exercises    Bilateral Lower Extremities AROM:;sitting;ankle pumps/circles;LAQ  -ERNST,BM,KC2 AROM:;15 reps;supine;ankle pumps/circles;quad sets;hip abduction/adduction;heel slides;sitting;LAQ  -TA AROM:;10 reps;supine;ankle pumps/circles;glut sets;quad sets;sitting;LAQ  -TA    Recorded by [ERNST,SIERRA,KC2] Susie Jacobo PTA (r) Bibiana Ram, PT Student (t) Susie Jacobo PTA (c) [TA] Kenyatta Madden, MARGARITO [TA] Kenyatta Madden PTA    Sensory Assessment/Intervention    Proprioception (P)  Trunk   Simultaneous filing. User may not have seen previous data.  -ERNST      Trunk Proprioception (P)  moderate impairment   posterior lean without LOB  -ERNST      Recorded by [ERNST] Susie Jacobo PTA      Positioning and Restraints    Pre-Treatment Position in bed  -ERNSTBMKC2 in bed  -TA in bed  -TA    Post Treatment Position bed  -ERNST,BM,KC2 bed  -TA bed  -TA    In Bed supine;call light within reach;encouraged to call for assist;exit alarm on;with family/caregiver  -ERNST,BM,KC2 supine;call light within reach;exit alarm on  -TA supine;call light within reach;exit alarm on;with family/caregiver;R heel elevated;L heel elevated  -TA    Recorded by [ERNST,BM,KC2] Susie Jacobo PTA (r) Bibiana Ram, PT Student (t) Susie Jacobo PTA (c) [TA] Kenyatta Madden PTA [TA] Kenyatta Madden PTA      User Key  (r) = Recorded By, (t) = Taken By, (c) = Cosigned By    Initials Name Effective Dates    TA Kenyatta Madden PTA 10/17/16 -     ERNST Jacobo PTA 10/17/16 -     SIERRA Ram, PT Student 12/05/17 -                 IP PT Goals       01/22/18 1357 01/20/18 0919 01/18/18 1512    Bed Mobility PT LTG    Bed Mobility PT LTG, Date Established   01/18/18  -MN (r) HL (t) MN (c)     Bed Mobility PT LTG, Time to Achieve   by discharge  -MN (r) HL (t) MN (c)    Bed Mobility PT LTG, Activity Type   all bed mobility  -MN (r) HL (t) MN (c)    Bed Mobility PT LTG, [place] Level   minimum assist (75% patient effort)  -MN (r) HL (t) MN (c)    Bed Mobility PT LTG, Outcome  goal met  -TA     Transfer Training PT LTG    Transfer Training PT LTG, Date Established   01/18/18  -MN (r) HL (t) MN (c)    Transfer Training PT LTG, Time to Achieve   by discharge  -MN (r) HL (t) MN (c)    Transfer Training PT LTG, [place] Level   minimum assist (75% patient effort)  -MN (r) HL (t) MN (c)    Transfer Training PT LTG, Assist Device   walker, rolling  -MN (r) HL (t) MN (c)    Transfer Training PT  LTG, Date Goal Reviewed 01/22/18  -ERNST (r) BM (t) ERNST (c)      Transfer Training PT LTG, Outcome goal ongoing  -ERNST (r) BM (t) ERNST (c) goal ongoing  -TA     Gait Training PT LTG    Gait Training Goal PT LTG, Date Established   01/18/18  -MN (r) HL (t) MN (c)    Gait Training Goal PT LTG, Time to Achieve   by discharge  -MN (r) HL (t) MN (c)    Gait Training Goal PT LTG, [place] Level   minimum assist (75% patient effort)  -MN (r) HL (t) MN (c)    Gait Training Goal PT LTG, Assist Device   walker, rolling  -MN (r) HL (t) MN (c)    Gait Training Goal PT LTG, Distance to Achieve   50 feet  -MN (r) HL (t) MN (c)    Gait Training Goal PT LTG, Date Goal Reviewed 01/22/18  -ERNST (r) BM (t) ERNST (c)      Gait Training Goal PT LTG, Outcome goal ongoing  -ERNST (r) BM (t) ERNST (c) goal ongoing  -TA       User Key  (r) = Recorded By, (t) = Taken By, (c) = Cosigned By    Initials Name Provider Type    DIAN Dean, PT Physical Therapist    RAQUEL Madden, PTA Physical Therapy Assistant    ERNST Jacobo, PTA Physical Therapy Assistant    KANA Heath, PT Student PT Student    SIERRA Ram, PT Student PT Student                  PT Recommendation and Plan  Anticipated Equipment Needs At Discharge:   (none)  Anticipated Discharge Disposition: assisted living, home with 24/7 care  Planned Therapy Interventions: bed mobility training, gait training, home exercise program, balance training, patient/family education, strengthening, stair training, ROM (Range of Motion), stretching  PT Frequency: other (see comments) (5-14x/week)  Plan of Care Review  Outcome Summary/Follow up Plan: Pt demonstrated bed mobility at [facility] with verbal cues needed for UE placement. Pt t/f min A supine-sit and sat EOB ~30 minutes while performing AROM LE ex. Pt may benefit from rehab at home upon D/C.           Outcome Measures       01/22/18 1357 01/21/18 1300 01/20/18 1100    How much help from another person do you currently need...    Turning from your back to your side while in flat bed without using bedrails? 3  -ERNST (r) BM (t) ERNST (c) 3  -TA 3  -TA    Moving from lying on back to sitting on the side of a flat bed without bedrails? 3  -ERNST (r) BM (t) ERNST (c) 3  -TA 3  -TA    Moving to and from a bed to a chair (including a wheelchair)? 3  -ERNST (r) BM (t) ERNST (c) 3  -TA 3  -TA    Standing up from a chair using your arms (e.g., wheelchair, bedside chair)? 3  -ERNST (r) BM (t) ERNST (c) 3  -TA 3  -TA    Climbing 3-5 steps with a railing? 2  -ERNST (r) BM (t) ERNST (c) 2  -TA 2  -TA    To walk in hospital room? 2  -ERNST (r) BM (t) ERNST (c) 2  -TA 2  -TA    AM-PAC 6 Clicks Score 16  -ERNST (r) BM (t) 16  -TA 16  -TA    Functional Assessment    Outcome Measure Options  AM-PAC 6 Clicks Basic Mobility (PT)  -TA AM-PAC 6 Clicks Basic Mobility (PT)  -TA      User Key  (r) = Recorded By, (t) = Taken By, (c) = Cosigned By    Initials Name Provider Type    RAQUEL Madden, PTA Physical Therapy Assistant    ERNST Jacobo PTA Physical Therapy Assistant    SIERRA Ram, PT Student PT Student           Time Calculation:         PT Charges       01/22/18 1444          Time Calculation    Start Time 1357  -ERNST      Stop Time 1438  -ERNST      Time Calculation (min) 41  min  -ERNST      Time Calculation- PT    Total Timed Code Minutes- PT 41 minute(s)  -ERNST        User Key  (r) = Recorded By, (t) = Taken By, (c) = Cosigned By    Initials Name Provider Type    ERNST Jacobo PTA Physical Therapy Assistant              PT G-Codes  PT Professional Judgement Used?: Yes  Outcome Measure Options: AM-PAC 6 Clicks Basic Mobility (PT)  Score: 17  Functional Limitation: Mobility: Walking and moving around  Mobility: Walking and Moving Around Current Status (): At least 40 percent but less than 60 percent impaired, limited or restricted  Mobility: Walking and Moving Around Goal Status (): At least 1 percent but less than 20 percent impaired, limited or restricted    Bibiana Ram, PT Student  1/22/2018

## 2018-01-22 NOTE — DISCHARGE PLACEMENT REQUEST
"Khushi Hilario (89 y.o. Female)     Date of Birth Social Security Number Address Home Phone MRN    02/03/1914  [place]  [address]  [place] [address] [phone] [id number]    Anglican Marital Status          Gnosticist        Admission Date Admission Type Admitting Provider Attending Provider Department, Room/Bed    1/17/18 Emergency Alebrt Ko MD Williams, Kevin L, MD [address], 410/1    Discharge Date Discharge Disposition Discharge Destination                      Attending Provider: Albert Ko MD     Allergies:  Lisinopril, Alphagan P [Brimonidine Tartrate], Brimonidine, Diamox Sequels [Acetazolamide]    Isolation:  None   Infection:  None   Code Status:  Conditional    Ht:  144.8 cm (57\")   Wt:  53.6 kg (118 lb 3.2 oz)    Admission Cmt:  None   Principal Problem:  Generalized weakness [R53.1]                 Active Insurance as of 1/17/2018     Primary Coverage     Payor Plan Insurance Group Employer/Plan Group    [facility] MEDICARE REPLACEMENT [facility] [id number]     Payor Plan Address Payor Plan Phone Number Effective From Effective To    [address] [address]  1/1/2017     [place] [address]       Subscriber Name Subscriber Birth Date Member ID       KHUSHI HILARIO 2/3/1914 [id number]                 Emergency Contacts      (Rel.) Home Phone Work Phone Mobile Phone    Agustín Stanley (Relative) [phone] -- --    Adonis Kenney (Friend) [phone] -- --              "

## 2018-01-23 VITALS
SYSTOLIC BLOOD PRESSURE: 136 MMHG | WEIGHT: 119.5 LBS | BODY MASS INDEX: 25.78 KG/M2 | RESPIRATION RATE: 20 BRPM | HEIGHT: 57 IN | OXYGEN SATURATION: 99 % | HEART RATE: 71 BPM | TEMPERATURE: 98.4 F | DIASTOLIC BLOOD PRESSURE: 63 MMHG

## 2018-01-23 LAB
ANION GAP SERPL CALCULATED.3IONS-SCNC: 7 MMOL/L (ref 5–15)
BUN BLD-MCNC: 27 MG/DL (ref 7–21)
BUN/CREAT SERPL: 25 (ref 7–25)
CALCIUM SPEC-SCNC: 8.1 MG/DL (ref 8.4–10.2)
CHLORIDE SERPL-SCNC: 114 MMOL/L (ref 95–110)
CO2 SERPL-SCNC: 20 MMOL/L (ref 22–31)
CREAT BLD-MCNC: 1.08 MG/DL (ref 0.5–1)
GFR SERPL CREATININE-BSD FRML MDRD: 56 ML/MIN/1.73 (ref 39–90)
GLUCOSE BLD-MCNC: 87 MG/DL (ref 60–100)
POTASSIUM BLD-SCNC: 3.9 MMOL/L (ref 3.5–5.1)
SODIUM BLD-SCNC: 141 MMOL/L (ref 137–145)

## 2018-01-23 PROCEDURE — G0378 HOSPITAL OBSERVATION PER HR: HCPCS

## 2018-01-23 PROCEDURE — 80048 BASIC METABOLIC PNL TOTAL CA: CPT | Performed by: INTERNAL MEDICINE

## 2018-01-23 PROCEDURE — 96376 TX/PRO/DX INJ SAME DRUG ADON: CPT

## 2018-01-23 PROCEDURE — 25010000002 CEFTRIAXONE PER 250 MG: Performed by: INTERNAL MEDICINE

## 2018-01-23 RX ORDER — ACETAMINOPHEN 325 MG/1
650 TABLET ORAL EVERY 6 HOURS PRN
Qty: 30 TABLET | Refills: 1 | Status: SHIPPED | OUTPATIENT
Start: 2018-01-23

## 2018-01-23 RX ORDER — ACETAMINOPHEN 325 MG/1
650 TABLET ORAL EVERY 6 HOURS PRN
Status: DISCONTINUED | OUTPATIENT
Start: 2018-01-23 | End: 2018-01-23 | Stop reason: HOSPADM

## 2018-01-23 RX ADMIN — METOPROLOL SUCCINATE 25 MG: 25 TABLET, EXTENDED RELEASE ORAL at 09:59

## 2018-01-23 RX ADMIN — SODIUM CHLORIDE 50 ML/HR: 900 INJECTION, SOLUTION INTRAVENOUS at 06:33

## 2018-01-23 RX ADMIN — SODIUM CHLORIDE 1 G: 9 INJECTION INTRAMUSCULAR; INTRAVENOUS; SUBCUTANEOUS at 09:59

## 2018-01-23 RX ADMIN — Medication 10 ML: at 09:58

## 2018-01-23 RX ADMIN — LEVOBUNOLOL HYDROCHLORIDE 1 DROP: 5 SOLUTION/ DROPS OPHTHALMIC at 11:16

## 2018-01-23 NOTE — THERAPY DISCHARGE NOTE
Acute Care - Physical Therapy Discharge Summary  [facility]       Patient Name: Radha Frias [facility]  : 2/3/1914  MRN: [id number]    Today's Date: 2018  Onset of Illness/Injury or Date of Surgery Date: 18    Date of Referral to PT: 18  Referring Physician: Dr. Lane      Admit Date: 2018      PT Recommendation and Plan    Visit Dx:    ICD-10-CM ICD-9-CM   1. Laceration of scalp, initial encounter S01.01XA 873.0   2. Osteoarthritis of left shoulder, unspecified osteoarthritis type M19.012 715.91   3. Chronic kidney disease, unspecified CKD stage N18.9 585.9   4. Dehydration E86.0 276.51   5. Left bundle branch block I44.7 426.3   6. Impaired functional mobility, balance, gait, and endurance Z74.09 V49.89   7. Frequent falls R29.6 V15.88             Outcome Measures       18 1357 18 1300       How much help from another person do you currently need...    Turning from your back to your side while in flat bed without using bedrails? 3  -ERNST (r) BM (t) ERNST (c) 3  -TA     Moving from lying on back to sitting on the side of a flat bed without bedrails? 3  -ERNST (r) BM (t) ERNST (c) 3  -TA     Moving to and from a bed to a chair (including a wheelchair)? 3  -ERNST (r) BM (t) ERNST (c) 3  -TA     Standing up from a chair using your arms (e.g., wheelchair, bedside chair)? 3  -ERNST (r) BM (t) ERNST (c) 3  -TA     Climbing 3-5 steps with a railing? 2  -ERNST (r) BM (t) ERNST (c) 2  -TA     To walk in hospital room? 2  -ERNST (r) BM (t) ERNST (c) 2  -TA     AM-PAC 6 Clicks Score 16  -ERNST (r) BM (t) 16  -TA     Functional Assessment    Outcome Measure Options  AM-PAC 6 Clicks Basic Mobility (PT)  -TA       User Key  (r) = Recorded By, (t) = Taken By, (c) = Cosigned By    Initials Name Provider Type    TA Kenyatta Madden, PTA Physical Therapy Assistant    ERNST Jacobo, PTA Physical Therapy Assistant    BM Bibiana Ram, PT Student PT Student                      IP PT Goals       18 1405 18 0582  01/20/18 0919    Bed Mobility PT LTG    Bed Mobility PT LTG, Outcome   goal met  -TA    Transfer Training PT LTG    Transfer Training PT  LTG, Date Goal Reviewed 01/23/18  -CZ 01/22/18  -ERNST (r) BM (t) ERNST (c)     Transfer Training PT LTG, Outcome goal not met  -CZ goal ongoing  -ERNST (r) BM (t) ERNST (c) goal ongoing  -TA    Transfer Training PT LTG, Reason Goal Not Met discharged from facility  -CZ      Gait Training PT LTG    Gait Training Goal PT LTG, Date Goal Reviewed 01/23/18  -CZ 01/22/18  -ERNST (r) BM (t) ERNST (c)     Gait Training Goal PT LTG, Outcome goal not met  -CZ goal ongoing  -ERNST (r) BM (t) ERNST (c) goal ongoing  -TA    Gait Training Goal PT LTG, Reason Goal Not Met discharged from facility  -CZ        01/18/18 1512          Bed Mobility PT LTG    Bed Mobility PT LTG, Date Established 01/18/18  -MN (r) HL (t) MN (c)      Bed Mobility PT LTG, Time to Achieve by discharge  -MN (r) HL (t) MN (c)      Bed Mobility PT LTG, Activity Type all bed mobility  -MN (r) HL (t) MN (c)      Bed Mobility PT LTG, [place] Level minimum assist (75% patient effort)  -MN (r) HL (t) MN (c)      Transfer Training PT LTG    Transfer Training PT LTG, Date Established 01/18/18  -MN (r) HL (t) MN (c)      Transfer Training PT LTG, Time to Achieve by discharge  -MN (r) HL (t) MN (c)      Transfer Training PT LTG, [place] Level minimum assist (75% patient effort)  -MN (r) HL (t) MN (c)      Transfer Training PT LTG, Assist Device walker, rolling  -MN (r) HL (t) MN (c)      Gait Training PT LTG    Gait Training Goal PT LTG, Date Established 01/18/18  -MN (r) HL (t) MN (c)      Gait Training Goal PT LTG, Time to Achieve by discharge  -MN (r) HL (t) MN (c)      Gait Training Goal PT LTG, [place] Level minimum assist (75% patient effort)  -MN (r) HL (t) MN (c)      Gait Training Goal PT LTG, Assist Device walker, rolling  -MN (r) HL (t) MN (c)      Gait Training Goal PT LTG, Distance to Achieve 50 feet  -MN (r) HL (t) MN (c)         User Key  (r) = Recorded By, (t) = Taken By, (c) = Cosigned By    Initials Name Provider Type    DIAN Dean, PT Physical Therapist    TA Kenyatta Madden, PTA Physical Therapy Assistant    ERNST Susie Jacobo, PTA Physical Therapy Assistant    CZ Jabari Armando, PT Physical Therapist    KANA Heath, PT Student PT Student    BM Bibiana Ram, PT Student PT Student              PT Discharge Summary  Anticipated Discharge Disposition: home with 24/7 care  Reason for Discharge: Per MD order, Discharge from facility  Outcomes Achieved: Patient able to partially acheive established goals  Discharge Destination: SNF      Jabari Armando, PT   1/23/2018

## 2018-01-23 NOTE — PLAN OF CARE
Problem: Inpatient Physical Therapy  Goal: Transfer Training Goal 1 LTG- PT  Outcome: Unable to achieve outcome(s) by discharge Date Met: 01/23/18 01/18/18 1512 01/23/18 1405   Transfer Training PT LTG   Transfer Training PT LTG, Date Established 01/18/18 --    Transfer Training PT LTG, Time to Achieve by discharge --    Transfer Training PT LTG, [place] Level minimum assist (75% patient effort) --    Transfer Training PT LTG, Assist Device walker, rolling --    Transfer Training PT LTG, Date Goal Reviewed --  01/23/18   Transfer Training PT LTG, Outcome --  goal not met   Transfer Training PT LTG, Reason Goal Not Met --  discharged from facility     Goal: Gait Training Goal LTG- PT  Outcome: Unable to achieve outcome(s) by discharge Date Met: 01/23/18 01/18/18 1512 01/23/18 1405   Gait Training PT LTG   Gait Training Goal PT LTG, Date Established 01/18/18 --    Gait Training Goal PT LTG, Time to Achieve by discharge --    Gait Training Goal PT LTG, [place] Level minimum assist (75% patient effort) --    Gait Training Goal PT LTG, Assist Device walker, rolling --    Gait Training Goal PT LTG, Distance to Achieve 50 feet --    Gait Training Goal PT LTG, Date Goal Reviewed --  01/23/18   Gait Training Goal PT LTG, Outcome --  goal not met   Gait Training Goal PT LTG, Reason Goal Not Met --  discharged from facility

## 2018-01-23 NOTE — DISCHARGE SUMMARY
[facility] Medicine Services  DISCHARGE SUMMARY       Date of Admission: 1/17/2018  Date of Discharge:  1/23/2018  Primary Care Physician: Katherine Mays MD    Presenting Problem/History of Present Illness:  Dehydration [E86.0]  Left bundle branch block [I44.7]  Laceration of scalp, initial encounter [S01.01XA]  Osteoarthritis of left shoulder, unspecified osteoarthritis type [M19.012]  Chronic kidney disease, unspecified CKD stage [N18.9] patient is 89-year-old female with history of hypertension, osteoarthritis arthritis and glaucoma who was seen at the emergency room secondary to history of frequent falls, weakness and decreased appetite and  level of consciousness.  Patient reportedly fell at her assisted living home and sustained a scalp laceration.   She had CT scan of the head at the emergency room doubt did not show any acute changes.  The scalp laceration was sutured in the emergency room.      Final Discharge Diagnoses:  Hospital Problem List     * (Principal)Generalized weakness (Chronic)    Scalp laceration    Hypertension (Chronic)    Frequent falls (Chronic)    Dehydration          Consults:   Consults     Date and Time Order Name Status Description    1/17/2018 1904 Hospitalist (on-call MD unless specified)            Procedures Performed:   None              Pertinent Test Results:   Lab Results (last 7 days)     Procedure Component Value Units Date/Time    CBC & Differential [[id number]] Collected:  01/17/18 1733    Specimen:  Blood Updated:  01/17/18 1740    Narrative:       The following orders were created for panel order CBC & Differential.  Procedure                               Abnormality         Status                     ---------                               -----------         ------                     CBC Auto Differential[[id number]]        Abnormal            Final result                 Please view results for these tests on the  individual orders.    CBC Auto Differential [[id number]]  (Abnormal) Collected:  01/17/18 1733    Specimen:  Blood Updated:  01/17/18 1740     WBC 9.05 10*3/mm3      RBC 3.79 10*6/mm3      Hemoglobin 11.1 (L) g/dL      Hematocrit 33.1 (L) %      MCV 87.3 fL      MCH 29.3 pg      MCHC 33.5 g/dL      RDW 13.1 %      RDW-SD 42.1 fl      MPV 10.0 fL      Platelets 168 10*3/mm3      Neutrophil % 65.4 %      Lymphocyte % 24.0 %      Monocyte % 9.0 %      Eosinophil % 1.1 %      Basophil % 0.3 %      Immature Grans % 0.2 %      Neutrophils, Absolute 5.92 10*3/mm3      Lymphocytes, Absolute 2.17 10*3/mm3      Monocytes, Absolute 0.81 10*3/mm3      Eosinophils, Absolute 0.10 10*3/mm3      Basophils, Absolute 0.03 10*3/mm3      Immature Grans, Absolute 0.02 10*3/mm3     Comprehensive Metabolic Panel [[id number]]  (Abnormal) Collected:  01/17/18 1733    Specimen:  Blood Updated:  01/17/18 1807     Glucose 134 (H) mg/dL      BUN 42 (H) mg/dL      Creatinine 1.58 (H) mg/dL      Sodium 138 mmol/L      Potassium 4.4 mmol/L      Chloride 101 mmol/L      CO2 25.0 mmol/L      Calcium 9.2 mg/dL      Total Protein 7.0 g/dL      Albumin 3.90 g/dL      ALT (SGPT) 19 U/L      AST (SGOT) 30 U/L      Alkaline Phosphatase 81 U/L      Total Bilirubin 0.9 mg/dL      eGFR   Amer 36 (L) mL/min/1.73      Globulin 3.1 gm/dL      A/G Ratio 1.3 g/dL      BUN/Creatinine Ratio 26.6 (H)     Anion Gap 12.0 mmol/L     Narrative:       The MDRD GFR formula is only valid for adults with stable renal function between ages 18 and 70.    Protime-INR [[id number]]  (Normal) Collected:  01/17/18 1733    Specimen:  Blood Updated:  01/17/18 1818     Protime 14.3 Seconds      INR 1.12    Narrative:       Therapeutic range for most indications is 2.0-3.0 INR,  or 2.5-3.5 for mechanical heart valves.    aPTT [[id number]]  (Normal) Collected:  01/17/18 1733    Specimen:  Blood Updated:  01/17/18 1818     PTT 30.6 seconds     Narrative:       The recommended Heparin  therapeutic range is 68-97 seconds.    Extra Tubes [[id number]] Collected:  01/17/18 1733    Specimen:  Blood from Blood, Venous Line Updated:  01/17/18 1846    Narrative:       The following orders were created for panel order Extra Tubes.  Procedure                               Abnormality         Status                     ---------                               -----------         ------                     Gold Top - SST[[id number]]                                   Final result                 Please view results for these tests on the individual orders.    [facility] [[id number]] Collected:  01/17/18 1733    Specimen:  Blood Updated:  01/17/18 1846     Extra Tube Hold for add-ons.      Auto resulted.       Basic Metabolic Panel [[id number]]  (Abnormal) Collected:  01/18/18 0846    Specimen:  Blood Updated:  01/18/18 0907     Glucose 129 (H) mg/dL      BUN 34 (H) mg/dL      Creatinine 1.41 (H) mg/dL      Sodium 137 mmol/L      Potassium 4.3 mmol/L      Chloride 105 mmol/L      CO2 24.0 mmol/L      Calcium 8.8 mg/dL      eGFR  African Amer 41 mL/min/1.73      BUN/Creatinine Ratio 24.1     Anion Gap 8.0 mmol/L     Narrative:       The MDRD GFR formula is only valid for adults with stable renal function between ages 18 and 70.    Basic Metabolic Panel [[id number]]  (Abnormal) Collected:  01/19/18 0618    Specimen:  Blood Updated:  01/19/18 0645     Glucose 118 (H) mg/dL      BUN 31 (H) mg/dL      Creatinine 1.33 (H) mg/dL      Sodium 137 mmol/L      Potassium 4.6 mmol/L      Chloride 106 mmol/L      CO2 23.0 mmol/L      Calcium 8.6 mg/dL      eGFR  African Amer 44 mL/min/1.73      BUN/Creatinine Ratio 23.3     Anion Gap 8.0 mmol/L     Narrative:       The MDRD GFR formula is only valid for adults with stable renal function between ages 18 and 70.    Lavender Top [[id number]] Collected:  01/19/18 0548    Specimen:  Blood Updated:  01/19/18 0916     Extra Tube hold for add-on      Auto resulted       Extra Tubes  [[id number]] Collected:  01/19/18 0548    Specimen:  Blood from Blood, Venous Line Updated:  01/19/18 0916    Narrative:       The following orders were created for panel order Extra Tubes.  Procedure                               Abnormality         Status                     ---------                               -----------         ------                     Lavender Top[[id number]]                                     Final result                 Please view results for these tests on the individual orders.    Urinalysis With / Culture If Indicated - Urine, Clean Catch [[id number]]  (Abnormal) Collected:  01/19/18 2343    Specimen:  Urine from Urine, Clean Catch Updated:  01/20/18 0005     Color, UA Yellow     Appearance, UA Clear     pH, UA <=5.0     Specific Gravity, UA 1.020     Glucose, UA Negative     Ketones, UA Negative     Bilirubin, UA Negative     Blood, UA Negative     Protein, UA Negative     Leuk Esterase, UA Small (1+) (A)     Nitrite, UA Negative     Urobilinogen, UA 0.2 E.U./dL    Urinalysis, Microscopic Only - Urine, Clean Catch [[id number]]  (Abnormal) Collected:  01/19/18 2343    Specimen:  Urine from Urine, Clean Catch Updated:  01/20/18 0041     RBC, UA 3-5 (A) /HPF      WBC, UA 13-20 (A) /HPF      Bacteria, UA 1+ (A) /HPF      Squamous Epithelial Cells, UA 6-12 (A) /HPF      Hyaline Casts, UA None Seen /LPF      Methodology Manual Light Microscopy    Basic Metabolic Panel [[id number]]  (Abnormal) Collected:  01/20/18 0610    Specimen:  Blood Updated:  01/20/18 0728     Glucose 75 mg/dL      BUN 24 (H) mg/dL      Creatinine 1.09 (H) mg/dL      Sodium 137 mmol/L      Potassium 4.0 mmol/L      Chloride 113 (H) mmol/L      CO2 18.0 (L) mmol/L      Calcium 7.8 (L) mg/dL      eGFR   Amer 56 mL/min/1.73      BUN/Creatinine Ratio 22.0     Anion Gap 6.0 mmol/L     Narrative:       The MDRD GFR formula is only valid for adults with stable renal function between ages 18 and 70.    Urine Culture -  Urine, Urine, Clean Catch [[id number]] Collected:  01/19/18 2343    Specimen:  Urine from Urine, Clean Catch Updated:  01/21/18 0645     Urine Culture --      Mixed Matheus Isolated    Narrative:         Specimen contains mixed organisms of questionable pathogenicity which indicates contamination with commensal matheus.  Further identification is unlikely to provide clinically useful information.  Suggest recollection.    Basic Metabolic Panel [[id number]]  (Abnormal) Collected:  01/21/18 0535    Specimen:  Blood Updated:  01/21/18 0659     Glucose 85 mg/dL      BUN 27 (H) mg/dL      Creatinine 1.15 (H) mg/dL      Sodium 139 mmol/L      Potassium 3.9 mmol/L      Chloride 111 (H) mmol/L      CO2 20.0 (L) mmol/L      Calcium 8.0 (L) mg/dL      eGFR  African Amer 52 mL/min/1.73      BUN/Creatinine Ratio 23.5     Anion Gap 8.0 mmol/L     Narrative:       The MDRD GFR formula is only valid for adults with stable renal function between ages 18 and 70.    Basic Metabolic Panel [[id number]]  (Abnormal) Collected:  01/23/18 0607    Specimen:  Blood Updated:  01/23/18 0729     Glucose 87 mg/dL      BUN 27 (H) mg/dL      Creatinine 1.08 (H) mg/dL      Sodium 141 mmol/L      Potassium 3.9 mmol/L      Chloride 114 (H) mmol/L      CO2 20.0 (L) mmol/L      Calcium 8.1 (L) mg/dL      eGFR   Amer 56 mL/min/1.73      BUN/Creatinine Ratio 25.0     Anion Gap 7.0 mmol/L     Narrative:       The MDRD GFR formula is only valid for adults with stable renal function between ages 18 and 70.        Imaging Results (last 7 days)     Procedure Component Value Units Date/Time    XR Shoulder 2+ View Left [[id number]] Collected:  01/17/18 1736     Updated:  01/17/18 1810    Narrative:         Three view left shoulder    HISTORY: Pain after falling    AP films with the humerus in internal and external rotation and  scapular Y view were obtained.    COMPARISON: None    No fracture or dislocation.  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    Degenerative changes in the thoracic spine.  Old granulomatous disease in the chest.      Impression:       CONCLUSION:  No fracture or dislocation.  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    [id number]    Electronically signed by:  Kevin Hernandez MD  1/17/2018 6:09 PM CST  Workstation: [id number]    XR Humerus Left [[id number]] Collected:  01/17/18 1736     Updated:  01/17/18 1812    Narrative:         Two view left humerus    HISTORY: Pain after falling    AP and lateral views obtained.    COMPARISON: None    No fracture or dislocation.  Advanced degenerative change glenohumeral joint with probable  secondary osteonecrosis of the humeral head and possible synovial  osteochondromatosis.      Impression:       CONCLUSION:  No fracture or dislocation    [id number]    Electronically signed by:  Kevin Hernandez MD  1/17/2018 6:11 PM CST  Workstation: [id number]    XR Pelvis 1 or 2 View [[id number]] Collected:  01/17/18 1736     Updated:  01/17/18 1820    Narrative:       PROCEDURE: XR PELVIS 1 OR 2 VW    VIEWS: 1    INDICATION: Pain    COMPARISON: None    FINDINGS:     - fracture: None    - alignment: Within normal limits    - misc: Age-related degenerative changes. Diffusely decreased  osseous mineralization. Degenerative changes are present in lower  lumbar spine. Irregular calcifications in the pelvis probably  represents a fibroid.          Impression:       No acute osseous abnormality identified..      Note:  if pain or symptoms persist beyond reasonable expectations    and follow-up imaging is anticipated,  cross sectional imaging   (CT and/or MRI) is suggested, as is deemed clinically   appropriate.    Electronically signed by:  Katherine Ashford MD  1/17/2018 6:19 PM CST  Workstation: [id number]    XR Chest 1 View  [[id number]] Collected:  01/17/18 1736     Updated:  01/17/18 1822    Narrative:       PROCEDURE: XR CHEST 1 VW    VIEWS:Single    INDICATION: Dyspnea    COMPARISON: Acute abdominal series: 1/24/2017    FINDINGS:       - lines/tubes: None    - cardiac: Borderline size    - mediastinum: Atherosclerotic calcification and aortic  tortuosity.     - lungs: No evidence of a focal air space process, pulmonary  interstitial edema, nodule(s)/mass.     - pleura: No evidence of  fluid.      - osseous: Degenerative changes of the bilateral shoulders are  stable.      Impression:       No acute cardiopulmonary process identified.      Electronically signed by:  Katherine Ashford MD  1/17/2018 6:21 PM CST  Workstation: [id number]    CT Head Without Contrast [[id number]] Collected:  01/17/18 1801     Updated:  01/17/18 1841    Narrative:       EXAM DESCRIPTION: CT HEAD WO CONTRAST    CLINICAL HISTORY:  head injury       COMPARISON: No studies available for comparison. Report of exam  5/22/2007 is reviewed.    DOSE LENGTH PRODUCT: 971.10    CONTRAST: None    TECHNIQUE:    Axial images from skull base to vertex.    This exam was performed according to our departmental dose  optimization program, which includes automated exposure control,  adjustment of the mA and/or KV according to patient size and/or  use of iterative reconstruction technique.    FINDINGS:  No Chiari malformation.  Extra-axial spaces: Appropriate for patient age and degree of  volume loss.    Intracranial hemorrhage: No evidence of intracranial hemorrhage  or suspicious extra-axial fluid collections.  Ventricular system: No hydrocephalus.   Basal cisterns: Patent.   Cerebral parenchyma: No edema, midline shift, or mass effect.  Gray-white differentiation is maintained. There is mild-moderate  volume loss with associated change. There is sequela chronic  microvascular disease.. She reported symmetric benign  calcification is again seen within the basal ganglia and  the  cerebellar hemispheres.    Midline shift: None.    Cerebellum: No acute or suspicious finding.   Brainstem : Unremarkable CT appearance.   Calvarium: No evidence of calvarial fracture.    Vascular system: Vascular calcification, otherwise unremarkable  noncontrast appearance.    Paranasal sinuses and mastoid air cells: No air-fluid levels or  significant mucosal thickening within the included sinuses. The  mastoids and middle ear cavities are clear.    Visualized orbits: No acute findings.    Visualized upper cervical spine: Not included.   Sella: Unremarkable.    Skull base: Unremarkable.     ADDITIONAL FINDINGS: There is a left posterior parietal scalp  contusion/hematoma.      Impression:       Left parietal scalp injury without underlying calvarial fracture,  intracranial hemorrhage, mass effect, or acute large vessel  distribution infarct.    Mild-moderate volume loss and sequela chronic microvascular  disease. Benign cerebral and cerebellar mineralization. The  latter finding was reported on the prior exam from 2017.    Electronically signed by:  Ryder Marcano MD  1/17/2018 6:40 PM  CST Workstation: [id number]    CT Cervical Spine Without Contrast [[id number]] Collected:  01/17/18 1801     Updated:  01/17/18 1849    Narrative:       EXAM DESCRIPTION: CT CERVICAL SPINE WO CONTRAST    CLINICAL HISTORY: head injury    COMPARISON: None      TECHNIQUE: Multiple contiguous noncontrast axial images are  obtained of the cervical spine. Coronal and sagittal multiplanar  reformatted images are also reviewed.   This exam was performed according to our departmental dose  optimization program, which includes automated exposure control,  adjustment of the mA and/or KV according to patient size and/or  use of iterative reconstruction technique.    DLP: 971.10     FINDINGS:   No evidence of prevertebral thickening or suspicious fluid  collection. The included lung apices are unremarkable for acute  finding. The included  skull base, mastoids and paranasal sinuses  are unremarkable. There is incidental note made of large torus  palatini.    There is generalized demineralization.    There is approximate 2 mm degenerative anterolisthesis of C3 upon  C4 and retrolisthesis of C5 upon C6. Otherwise there is  straightening and reversal of normal curvature with anatomic  alignment.  No evidence of compression fracture deformity. The craniocervical  articulations are intact. There is arthritic changes at C1-C2  however no widening of the atlantodental interval. The lateral  masses are appropriately positioned. No fracture of the dens  identified. Normal alignment of the posterior facets. No fracture  or perched facet. Arthritic changes are present greater on the  left.    Multilevel disc space narrowing, greatest at C4-5 and C5-6.    C4-5: There is mild disc osteophyte complex with left greater  than right uncovertebral posterior facet hypertrophy. There is  severe left and moderate right foraminal stenosis.    C5-6: There is disc osteophyte complex contributing to mild  central spinal canal stenosis. Uncovertebral and posterior facet  hypertrophy contribute to bilateral severe foraminal stenosis.    C6-7: Uncovertebral and posterior facet hypertrophy on the left  contributing to moderate foraminal stenosis.      Impression:       No acute fracture or subluxation of the cervical spine.    Demineralization and multilevel degenerative changes of the  cervical spine as detailed above.    Electronically signed by:  Ryder Marcaon MD  1/17/2018 6:47 PM  [facility] Workstation: [id number]    CT Head Without Contrast [[id number]] Collected:  01/18/18 2201     Updated:  01/18/18 2227    Narrative:       PROCEDURE: CT HEAD WO CONTRAST    INDICATION:  Fall, scalp laceration    COMPARISON:  None    TECHNIQUE:  CT head, without iv contrast.       This exam was performed according to our departmental  dose-optimization program, which includes automated  exposure  control, adjustment of the mA and/or kV according to patient size  and/or use of iterative reconstruction technique.  DLP is 1133.3    FINDINGS:    The degree of cerebral atrophy is within normal limits for age.    There is preservation of the gray-white matter differentiation.      Calcifications in the relative hemispheres and the bilateral  basal ganglia are stable from prior exam    There are extensive age related degenerative cortical and deep  white matter changes.    There is no evidence of a hemorrhage or intracranial mass.    There is no midline shift.    The ventricles are normal in size and configuration.    The brain stem, cerebellum, globes, paranasal sinuses, and  osseous structures are within normal limits. Mild atherosclerotic  calcification is seen in the vessels at skull base. There are  metallic scalp staples present in the left parietal region.        Impression:       No acute intracranial abnormality.    If pain or symptoms persist beyond reasonable expectations, an  MRI examination is suggested as is deemed clinically appropriate.    Electronically signed by:  Katherine Ashford MD  1/18/2018 10:26 PM  CST Workstation: [id number]            Chief Complaint on Day of Discharge: None.    Hospital Course:  The patient was admitted and managed as follows:    1.  Acute kidney injury: She was placed on IV hydration with routine monitoring of her renal function.  Creatinine did improve and was back to her baseline on discharge.   2.  UTI: This was treated with IV antibiotics and Urine culture isolated mixed matheus.  Antibiotics were discontinued prior to discharge.     3.  Deconditioning: Patient benefited from PT and OT.  4.  Discharge planning: Patient was discharged to a skilled nursing facility based  on the fact that she is not capable of functioning at the assisted living property.                Condition on Discharge:  Stable and improved    Physical Exam on Discharge:  /63  Pulse 71  " Temp 98.8 °F (37.1 °C) (Axillary)   Resp 20  Ht 144.8 cm (57\")  Wt 54.2 kg (119 lb 8 oz)  SpO2 95%  BMI 25.86 kg/m2  Physical Exam   Constitutional: She is oriented to person, place, and time. She appears well-developed and well-nourished. She is cooperative. No distress.   HENT:   Head: Normocephalic and atraumatic.   Right Ear: External ear normal.   Left Ear: External ear normal.   Nose: Nose normal.   Mouth/Throat: Oropharynx is clear and moist.   Eyes: Conjunctivae and EOM are normal. Pupils are equal, round, and reactive to light.   Neck: Normal range of motion. Neck supple. No JVD present. No thyromegaly present.   Cardiovascular: Normal rate, regular rhythm, normal heart sounds and intact distal pulses.  Exam reveals no gallop and no friction rub.    No murmur heard.  Pulmonary/Chest: Effort normal and breath sounds normal. No stridor. No respiratory distress. She has no wheezes. She has no rales. She exhibits no tenderness.   Abdominal: Soft. Bowel sounds are normal. She exhibits no distension and no mass. There is no tenderness. There is no rebound and no guarding. No hernia.   Musculoskeletal: Normal range of motion. She exhibits no edema, tenderness or deformity.   Neurological: She is alert and oriented to person, place, and time. She has normal reflexes. She exhibits normal muscle tone.   Skin: Skin is warm and dry. No rash noted. She is not diaphoretic. No erythema. No pallor.   Psychiatric: She has a normal mood and affect. Her behavior is normal. Judgment and thought content normal.   Nursing note and vitals reviewed.        Discharge Disposition:  Skilled Nursing Facility (DC - External)    Discharge Medications:   Radha Hilario   Home Medication Instructions CANDIS:[id number]    Printed on:01/23/18 8103   Medication Information                      acetaminophen (TYLENOL) 325 MG tablet  Take 2 tablets by mouth Every 6 (Six) Hours As Needed for Mild Pain .             bimatoprost " (LUMIGAN) 0.01 % ophthalmic drops  Administer 1 drop to both eyes Every Night.             ipratropium (ATROVENT) 0.06 % nasal spray  2 sprays into each nostril At Night As Needed for Rhinitis.             levobunolol (BETAGAN) 0.5 % ophthalmic solution  Administer 1 drop to both eyes 2 (Two) Times a Day.             metoprolol succinate XL (TOPROL XL) 25 MG 24 hr tablet  Take 1 tablet by mouth Daily.             Multiple Vitamins-Minerals (CENTRUM SILVER PO)  Take 1 tablet by mouth Daily.             polyethylene glycol (MIRALAX) packet  Take 17 g by mouth Daily As Needed (constipation).             triamterene-hydrochlorothiazide (MAXZIDE-25) 37.5-25 MG per tablet  Take 0.5 tablets by mouth Daily.                 Discharge Diet:  heart healthy    Activity at Discharge:  as tolerated    Discharge Care Plan/Instructions: Provided    Follow-up Appointments:   Future Appointments  Date Time Provider Department Center   1/29/2018 1:45 PM Katherine Mays MD [facility] None   2/7/2018 2:00 PM Serafin Ko DPM [facility] P[facility] None   8/13/2018 1:15 PM Tomas Cavanaugh MD [facility] None       Test Results Pending at Discharge:     Lyle Lane MD  01/23/18  11:39 AM    Time: 45 minutes.

## 2018-01-23 NOTE — PLAN OF CARE
Problem: Patient Care Overview (Adult)  Goal: Plan of Care Review  Outcome: Ongoing (interventions implemented as appropriate)   01/23/18 0514   Coping/Psychosocial Response Interventions   Plan Of Care Reviewed With patient   Patient Care Overview   Progress no change   Outcome Evaluation   Outcome Summary/Follow up Plan No change       Problem: Fluid Volume Deficit (Adult)  Goal: Fluid/Electrolyte Balance  Outcome: Ongoing (interventions implemented as appropriate)    Goal: Comfort/Well Being  Outcome: Ongoing (interventions implemented as appropriate)      Problem: Fall Risk (Adult)  Goal: Absence of Falls  Outcome: Ongoing (interventions implemented as appropriate)      Problem: Pressure Ulcer Risk (Anup Scale) (Adult,Obstetrics,Pediatric)  Goal: Skin Integrity  Outcome: Ongoing (interventions implemented as appropriate)